# Patient Record
Sex: FEMALE | Race: WHITE | URBAN - METROPOLITAN AREA
[De-identification: names, ages, dates, MRNs, and addresses within clinical notes are randomized per-mention and may not be internally consistent; named-entity substitution may affect disease eponyms.]

---

## 2023-12-20 LAB
AMB EXT ANTIBODY SCREEN: NEGATIVE
AMB EXT HEMATOCRIT: 33
AMB EXT HEMOGLOBIN: 11.5
AMB EXT HIV AG AB COMBO: NEGATIVE
AMB EXT HPV: NEGATIVE
AMB EXT MCV: 93
AMB EXT PLATELETS: 293
AMB EXT RH FACTOR: POSITIVE
AMB EXT TRISOMY 13: NEGATIVE
AMB EXT URINE CULTURE ROUTINE: NEGATIVE

## 2024-01-02 ENCOUNTER — TELEPHONE (OUTPATIENT)
Dept: OBGYN CLINIC | Facility: CLINIC | Age: 39
End: 2024-01-02

## 2024-01-03 ENCOUNTER — OFFICE VISIT (OUTPATIENT)
Dept: OBGYN CLINIC | Facility: CLINIC | Age: 39
End: 2024-01-03

## 2024-01-03 VITALS
BODY MASS INDEX: 28.04 KG/M2 | SYSTOLIC BLOOD PRESSURE: 111 MMHG | HEIGHT: 68 IN | WEIGHT: 185 LBS | DIASTOLIC BLOOD PRESSURE: 75 MMHG | HEART RATE: 85 BPM

## 2024-01-03 DIAGNOSIS — O09.522 MULTIGRAVIDA OF ADVANCED MATERNAL AGE IN SECOND TRIMESTER: ICD-10-CM

## 2024-01-03 DIAGNOSIS — Z3A.14 14 WEEKS GESTATION OF PREGNANCY: Primary | ICD-10-CM

## 2024-01-03 PROBLEM — O09.529 ADVANCED MATERNAL AGE IN MULTIGRAVIDA: Status: ACTIVE | Noted: 2024-01-03

## 2024-01-03 PROBLEM — F41.9 ANXIETY: Status: ACTIVE | Noted: 2024-01-03

## 2024-01-03 PROBLEM — O09.529 ADVANCED MATERNAL AGE IN MULTIGRAVIDA (HCC): Status: ACTIVE | Noted: 2024-01-03

## 2024-01-03 PROCEDURE — 3078F DIAST BP <80 MM HG: CPT | Performed by: ADVANCED PRACTICE MIDWIFE

## 2024-01-03 PROCEDURE — 99203 OFFICE O/P NEW LOW 30 MIN: CPT | Performed by: ADVANCED PRACTICE MIDWIFE

## 2024-01-03 PROCEDURE — 3074F SYST BP LT 130 MM HG: CPT | Performed by: ADVANCED PRACTICE MIDWIFE

## 2024-01-03 PROCEDURE — 3008F BODY MASS INDEX DOCD: CPT | Performed by: ADVANCED PRACTICE MIDWIFE

## 2024-01-03 RX ORDER — MULTIVIT,IRON,MINERALS/LUTEIN
TABLET ORAL
COMMUNITY

## 2024-01-03 NOTE — PROGRESS NOTES
CHIEF COMPLAINT:  Chief Complaint   Patient presents with    Prenatal Care        HPI:  Merle is 38 year old, female, here today to establish care with this CNM office in pregnancy. Initiated care at outside clinic and had some initial lab work. Records were received.  Currently, she is feeling well. Denies 1st trimester danger signs. However, she had some spotting on  and went to [facility] ED. Ultrasound in [facility]where but live IUP with subchorionic hemorrhage noted.    Patient's last menstrual period was 2023 (approximate).          , term , boy, 8 lbs, denies complications, [facility]  , term , boy, 8 lbs 3 oz, denies complications, with [facility]   SAB  Current, as noted above. No current bleeding  LMP 23 but re-dated by ED ultrasound. 14w2d today with [facility] 24      HISTORY:  History reviewed. No pertinent past medical history.   History reviewed. No pertinent surgical history.   History reviewed. No pertinent family history.   Social History:   Social History     Socioeconomic History    Marital status: Single   Tobacco Use    Smoking status: Never    Smokeless tobacco: Never   Substance and Sexual Activity    Alcohol use: Never    Drug use: Not Currently          Medications (Active prior to today's visit):  Current Outpatient Medications   Medication Sig Dispense Refill    Prenatal Vit-Fe Fumarate-FA (MULTI PRENATAL) 27-0.8 MG Oral Tab Take by mouth.         Allergies:  Not on File    REVIEW OF SYSTEMS:  Review of Systems   Constitutional: Negative.    Genitourinary: Negative.         PHYSICAL EXAM:  Vitals:    24 1111   BP: 111/75   Pulse: 85     Physical Exam  Vitals reviewed.   Constitutional:       Appearance: Normal appearance.   HENT:      Head: Normocephalic.   Pulmonary:      Effort: Pulmonary effort is normal.   Neurological:      Mental Status: She is alert and oriented to person, place, and time.   Psychiatric:         Behavior: Behavior normal.          Thought Content: Thought content normal.         Judgment: Judgment normal.         ASSESSMENT/PLAN:   Merle was seen today for prenatal care.    Diagnoses and all orders for this visit:    14 weeks gestation of pregnancy    Multigravida of advanced maternal age in second trimester         Records reviewed and is appropriate for CNM care   Already taking prenatal vitamins  baby ASA not indicated. Risk factors bolded below    Pre-eclampsia Risk Assessment:   High Risk Factors (any 1 of below):   - H/O preeclampsia in prior pregnancy  - Autoimmune disease (lupus, antiphospholipid syndrome)  - Multifetal pregnancy  - cHTN  - Diabetes  Moderate Risk Factors (any 2 of below)    - Nulliparus  - Obesity  - H/O preeclampsia in 1st degree relative  - Socioeconomic characeristics (AA race, low socioeconomic status)  - AMA  - Personal H/O prior SGA or low birth weight, adverse pregnancy outcome, >10yr pregnancy interval    Next visit: Patient to schedule Nurse Education visit, next available, and NOB for 12wga    Counseling included:  -- CNM care and protocols  -- Discussed importance of folic acid  -- Warning signs reviewed. Advised to call office if occur  -- Schedule RN OB ED visit   -- 30 minutes face to face counseling, chart review, orders and coordination of care    Pt verbalized understanding. All questions answered. No barriers to learning identified

## 2024-01-03 NOTE — PATIENT INSTRUCTIONS
Healthy Eating Habits During Pregnancy    It’s important to develop healthy eating habits while you are pregnant, for you as well as for your baby. Here are some ways to stay healthy.  Aim for a healthy weight  A slow, steady rate of weight gain is often best. After the first trimester, you may gain about a pound a week. If you were overweight before pregnancy, you need to gain fewer pounds. Your healthcare provider can give you a healthy weight goal for your pregnancy.  Don’t diet  Now is not the time to diet. You may not get enough of the nutrients you and your baby need. Instead, learn how to be a healthy eater. Start by doing it for your baby. Soon, you may do it for yourself.  Vitamins and supplements  Talk with your healthcare provider about taking these and other prenatal vitamins and supplements.  Iron makes the extra blood you need now.  Calcium and vitamin D help build and keep strong bones.  Folic acid helps prevent certain birth defects.  Iodine helps the thyroid work right.  Some vitamins may not be safe to take. Your healthcare provider will tell you which ones to avoid.  Fluids  Drink at least 8 to 10 cups of fluid daily. Your baby needs fluids. Fluids also decrease constipation, flush out toxins and waste, limit swelling, and help prevent bladder infections. Water is best. Other good choices are:  Water or seltzer water with a slice of lemon or lime (These can also help ease an upset stomach.)  Clear soups that are low in salt  Low-fat or fat-free milk, soy or rice milk with calcium added  Popsicles or gelatin  Things to avoid  Some things might harm your growing baby. Don’t eat or drink:  Alcohol  Unpasteurized dairy foods and juices  Raw or undercooked meat, poultry, fish, or eggs  Unwashed fruits and vegetables  Prepared meats, like deli meats or hot dogs, unless heated until steaming hot  Fish that are high in mercury, like shark, swordfish, ryland mackerel, tilefish, and albacore tuna  Things to  limit  Ask your healthcare provider whether it’s safe to eat or drink:  Caffeine  Artificial sweeteners  Organ meats  Certain types of fish  Fish and shellfish that contain mercury in lower amounts, like shrimp, canned light tuna, salmon, pollock, and catfish  Shun last reviewed this educational content on 2018 [employer]. [address] [place] [address], [place] [address]. All rights reserved. This information is not intended as a substitute for professional medical care. Always follow your healthcare professional's instructions.        Nutrition During Pregnancy    Having a healthy baby depends mostly on you. What you eat matters to your baby and your health. During pregnancy, you will likely need about 300 more calories per day than before you became pregnant. Each day, try to eat the number of servings listed here for each food group. In addition, cut down on salt and caffeine. Limit the amount of sweets and high-fat foods you eat. Don’t smoke or drink alcohol.  Important: See your healthcare provider as often as requested. If you have any questions, be sure to ask them.  Fruits  2 cups  Examples of 1-cup servings:  1 medium apple  1 medium orange  1 medium banana  1 cup chopped fruit  1 cup 100% fruit juice (pasteurized)  1/2 cup dried fruit Vegetables  2-1/2 to 3 cups   Examples of 1 servin cups raw, leafy greens  1 cup raw or cooked cut-up vegetables  1 cup 100% vegetable juice (pasteurized) Grains & Cereals*  6 to 8 ounces  Examples of 1-ounce servings:  1 slice bread  1/2 cup cooked rice  1/2 cup cooked cereal  1/2 cup pasta  1 ounce cold cereal Fats & Oils  6 to 8 teaspoons   Dairy**  3 cups  Examples of 1-cup servings:  1 cup milk  1 cup yogurt  1-1/2 ounces natural cheese  2 ounces processed cheese Protein---  5 to 6-1/2 ounces  Examples of 1-ounce servings:  1 egg  1 ounce of lean meat, poultry, or fish  1/4 cup cooked beans  1 tablespoon peanut butter  1/2 ounce nuts  Fluids  8 or more 8-ounce glasses  Examples:  Water  Diluted juices: Apple, orange, cranberry  Mineral water  Clear soups, broth     *Note: Choose whole grains whenever possible.  ** Note: Try to choose low-fat options; avoid soft cheeses and unpasteurized milk.  --- Notes: Avoid raw or undercooked meats, eggs, and seafood. fish, and shellfish. Also, some types of fish, like shark, swordfish, and ryland mackerel should not be eaten during pregnancy. Avoid hot dogs, luncheon meats, and cold cuts unless heated to steaming just before being served. Ask your healthcare provider about safe choices.     Prenatal supplements  A prenatal supplement is a pill that you take daily during pregnancy. It helps make sure you’re getting the right amount of certain nutrients that are important to your baby. Ask your healthcare provider to help you choose the best one for you. Important nutrients during pregnancy include:  Folic acid. It's best to start taking this supplement 1 month before you start trying to get pregnant. Folic acid helps prevent certain problems in your baby. During pregnancy, you need to take 400 micrograms (mcg) of folic acid every day for the first 2 to 3 months after conception, and then 600 mcg is needed for growing fetus and placenta.  Iron, calcium, and vitamin D. You may also be advised to take these supplements during pregnancy. They help keep you and your baby healthy. Be sure to take them at different times because calcium makes it hard for the body to absorb iron. Taking iron with orange juice helps to increase its absorption.   [employer] last reviewed this educational content on 12/1/2017 © 2000-2020 The [employer]. [address] [place] [address], [place] [address]. All rights reserved. This information is not intended as a substitute for professional medical care. Always follow your healthcare professional's instructions.        Pregnancy: Planning Your Exercise Routine    While you’re pregnant, an  exercise routine helps both your mind and your body feel good. It tones your muscles and makes them stronger. It also gives you and your baby more oxygen.  The right exercise for you  Overall conditioning is best for you and your baby. Try walking, swimming, or riding a stationary bike. Always warm up, cool down, and drink enough fluids. Keep a snack close by in case your blood sugar gets low. Discuss exercise choices with your healthcare provider. Talk about the following:  If you already exercise, find out how to adapt your routine while you’re pregnant. Keep the intensity of the exercise moderate.  Ask if there are any local prenatal exercise classes, like yoga or water aerobics. Find out which prenatal exercise videos are good choices.  If you were not exercising before your pregnancy, find out the best way to start. Now is not the time to begin a new workout on your own. Start slowly. Listen to your body.  Ask which forms of exercise you should avoid. These may include risky activities like hot yoga, horseback riding, scuba diving, skiing, skating, and contact sports.  Pelvic tilts  These help strengthen your stomach muscles and low back. You can do pelvic tilts instead of sit-ups.  Do this exercise on your hands and knees.  Relax the back of your neck. Pull your stomach in until your low back flattens.  Hold for 30 seconds. Release. Repeat 10 times. Do this twice a day.  Kegel exercises  Kegel exercises strengthen the pelvic muscles. Doing Kegels daily helps prepare these muscles for delivery. Kegels also help ease your recovery. You exercise these muscles by tightening, holding, then relaxing them. To do 1 type of Kegel exercise, contract as if you were stopping your urine stream (but do it when you’re not urinating). Hold for 10 seconds, then repeat 10 times, a few times a day.  Tips to add activity  Here are some tips to follow:  Park the car farther from a store and walk.  If you can, do errands on foot  instead of driving.  Walk across the office to talk to someone in person instead of calling.  While waiting for appointments, go up and down stairs or around the block.   Tips to stay active  Here are some tips to follow:  Maintain your routine. But exercise less intensely if you feel tired.  Base your workout on how you feel, not your heart rate. Heart rates aren’t a good way to measure effort during pregnancy.  Avoid exercising on your back after week 16.   What are the warning signs that I should stop exercising?  Stop exercising and call your healthcare provider if you have any of these symptoms:  Vaginal bleeding   Dizziness or feeling faint   Increased shortness of breath   Chest pain   Headache   Muscle weakness   Calf (back of the leg) pain or swelling    Uterine contractions or pre-term labor   Decreased fetal movement   Fluid leaking (or gushing) from your vagina  [employer] last reviewed this educational content on 1/1/2018  © 7298-5860 [employer]. [address] [place] [address]. All rights reserved. This information is not intended as a substitute for professional medical care. Always follow your healthcare professional's instructions.        Exercise During Pregnancy  Regular exercise can help you adapt to the changes your body is going through during pregnancy. Exercising may help you relax, and it gets you ready for labor and delivery. Talk to your healthcare provider about the kinds of activities you can do. Then go ahead and enjoy them.    Get started  Even if you didn’t exercise before pregnancy, it is not too late to start. Choose an activity that you like and that fits your lifestyle. Begin slowly and build up a little at a time. Be sure to check with your healthcare provider before starting any exercise program. The following tips may help you get started:  Choose a time and place to exercise each day.  Wear loose-fitting clothes and comfortable athletic shoes.  Stretch  before and after you exercise. (Be sure to stretch slowly and to hold stretches for 30 to 40 seconds.)  Be active  Unless your healthcare provider says otherwise, try to exercise for 30 minutes or more most days of the week:  Overall conditioning, like swimming, bicycling, or walking, is especially beneficial.  Aerobics and exercises that increase your pulse rate help condition your body and strengthen your heart. Ask about special prenatal aerobics classes.  Exercise safely  These tips will help you have a safe, healthy workout:  Stay cool. Stop exercising if you feel overheated.  Slow down if you’re out of breath. If you can’t talk during exercise, lower the intensity of the workout.  Monitor the intensity of your workout. Only do moderate-intensity (not strenuous) exercise.  Stay off your back. Lying on your back can decrease blood flow to your baby.  Drink water before, during and after your workout.  Eat 300 extra calories a day. A light snack before and after you exercise will help keep your energy up.  Avoid activities requiring balancing skills later in pregnancy.  Do Kegel exercises  Kegel exercises strengthen the pelvic floor muscles used in childbirth. These muscles are the same ones used to stop the flow of urine. Do Kegel exercises daily:  Squeeze your pelvic floor muscles for a count of 3.  Relax, then squeeze again.  Repeat 10 to 15 times in a row at least 3 times a day.  You can do Kegel exercises anytime and anywhere.  Keep walking  No matter what other exercise you do, try to walk whenever you can:  If you’re working all day, take a lunchtime walk in the park with a friend.  When you shop, park away from the store entrance and walk the extra distance.  Take the stairs instead of the elevator.     When to stop exercising and call your healthcare provider  Call your healthcare provider right away if you have:  Shortness of breath before starting exercise  Vaginal bleeding  Dizziness or feeling  faint  Chest pain  Headache  Decreased fetal movement   contractions   Muscle weakness  Calf pain or swelling  Fluid leaking from the vagina   [employer] last reviewed this educational content on 2017 [employer]. [address] [place] [address], [place] [address]. All rights reserved. This information is not intended as a substitute for professional medical care. Always follow your healthcare professional's instructions.        Pregnancy: Your Weight     Your weight will be checked regularly by your healthcare provider.   Being a healthy weight is important for both you and your baby. The weight you gain now is not just extra fat. It is also the weight of your baby. And it is the increased blood and fluids to support the baby. A slow, steady rate of gain is best. How much you should gain depends on your weight before getting pregnant. Check with your healthcare provider to find out what is right for you.  Talk to your healthcare provider if you have any questions.   If you gain too much  Gaining too much weight might cause you to feel tired or you could have a harder pregnancy or birth. If you and your healthcare provider decide you’re gaining too much:  Eat fewer fats and sugars. Instead, eat fruit, vegetables, and whole-grain foods.  Drink plenty of water between meals.  Get at least 20 minutes of light exercise, like walking, each day.  Don’t diet. You might not get enough of the nutrients you or your baby needs.  Keep a diet diary to help you gauge what and how much you are eating.  If you’re not gaining enough  If you don’t gain enough, your baby could be too small or have health problems. Women tend to gain most of their weight in the second and third trimesters. For now:  Eat many types of foods. Make sure you get enough calcium, protein, and carbohydrates.  Don’t skip meals.  Eat healthy snacks.  Pick nutrient-dense, high-calorie healthy food like trail mix or protein  flores.  See a dietitian for help.  Talk to your healthcare provider if you have had an eating disorder or problems with certain foods.  The following are ways to get more calories:  Eat breakfast every day. Peanut butter or a slice of cheese on toast can give you an extra protein boost.  Snack between meals. Yogurt and dried fruits can provide protein, calcium, and minerals.  Try to eat more foods that are high in good fats, like nuts, fatty fish, avocados, and olive oil.  Drink juices made from real fruit that are high in vitamin C or beta carotene, like grapefruit juice, orange juice, papaya nectar, apricot nectar, and carrot juice.  Avoid junk food, like foods high in sugar.  [employer] last reviewed this educational content on 1/1/2018 © 2000-2020 The [employer]. [address] [place] [address]. All rights reserved. This information is not intended as a substitute for professional medical care. Always follow your healthcare professional's instructions.        Dental Care for Pregnant Women  Pregnancy is a time when your oral health needs more attention. Hormone changes during pregnancy can cause certain problems with teeth and gums, and make treatment more complicated.  How pregnancy affects oral health  During pregnancy, hormone changes can cause swollen, bleeding, and irritated gums (gingivitis). Your gums may be very sore, and brushing and flossing may cause discomfort. If left untreated, gingivitis can lead to a more serious gum disease called periodontitis. Severe periodontitis can lead to tooth loss.  Some pregnant women also have small bright-red growths on their gums that bleed easily. These are often called “pregnancy tumors.” They are not cancer. They usually go away right after birth. Talk with your dentist or healthcare provider if you have concerns.  Keeping a healthy mouth  Brush twice daily with fluoride toothpaste. Floss at least once a day.  If you have morning sickness,  rinse your mouth with a teaspoon of baking soda mixed with water after vomiting. Do not brush your teeth right after vomiting. This can remove tooth enamel.  See your dentist for cleanings and checkups more often if needed. This is especially true in your second and third trimesters.  Ask your dentist or healthcare provider if you should use a special mouth rinse to help prevent gingivitis.  Tell your dentist or healthcare provider about any changes in your mouth, such as soreness or bleeding.  Safety concerns  Make sure to tell your dentist that you’re pregnant. He or she can help you stay safe. If you need to have dental X-rays during pregnancy, he or she will make sure you are fully protected. You will wear a lead apron over your belly during the X-ray process. The apron helps block radiation from the X-rays.  If you need to take medicines like antibiotics or pain relievers for dental problems, talk with your healthcare providers first. Your providers will discuss the risks and benefits of taking these during pregnancy.  If you have a high-risk pregnancy, your dentist and your healthcare provider may advise that certain dental treatments wait until after you give birth.  [email or web address] last reviewed this educational content on 12/1/2017 © 2000-2020 [employer]. [address] [place] [address], [place] [address]. All rights reserved. This information is not intended as a substitute for professional medical care. Always follow your healthcare professional's instructions.           Comfort Tips During Pregnancy    Talk with your healthcare provider before using pain-relieving medicine at any time during your pregnancy.  First trimester tips  Nausea  Get up slowly. Eat a few unsalted crackers before you get out of bed.  Avoid smells that bother you.  Eat small bland low fat, light high-protein meals at frequent intervals.  Sip on water, weak tea, or clear soft drinks, like ginger ale. Eat ice chips.  Fatigue  Take  catnaps when you can.  Get regular exercise.  Accept help from others.  Practice good sleep habits, like going to bed and getting up at the same time each day. Use your bed only for sleep and sex.  Mood swings  Talk about your feelings with others, including other mothers.  Limit sugar, chocolate, and caffeine.  Eat a healthy diet. Don’t skip meals.  Get regular exercise.  Headaches  Get fresh air and exercise.  Relax and get enough rest.  Check with your healthcare provider before taking any pain medicines.  Second trimester tips  Here are some suggestions to help you cope:  To limit ankle swelling, sit with your feet raised or wear support hose.  If you have pain in your groin and stomach (round ligament pain), avoid sudden twisting movements.  For leg cramps, flexing your foot often brings immediate relief. You also may try massaging your calf in long, downward strokes, or stretching your legs before going to bed. Get enough exercise and wear shoes with flexible soles.  Third trimester tips  Reducing heartburn  Eat small, light meals throughout the day rather than 3 large ones.  Sleep with your upper body raised 6 inches. Don’t lie down until 2 hours after you eat.  Don't eat greasy, fried, or spicy foods.  Avoid citrus fruits and juices.  Treating constipation  Eat foods high in fiber (whole-grain foods, fresh fruit and vegetables).  Drink plenty of water.  Get regular exercise.  Discuss other medicines (like docusate and psyllium) with your healthcare provider.  Taking care of your breasts  Avoid using harsh soaps or alcohol, which can cause excessive dryness.  Wear nursing bras. They provide more support than regular bras and can be used after pregnancy if you breastfeed.  Getting a good night’s sleep  Take a warm shower before bed.  Sleep on a firm mattress.  Lie on your side with 1 leg crossed over the other.  Use pillows to support arms, legs, and belly.  Shun last reviewed this educational content on  10/1/2017  © 2566-8444 [employer]. [address] [place] [address], [place] [address]. All rights reserved. This information is not intended as a substitute for professional medical care. Always follow your healthcare professional's instructions.        Adapting to Pregnancy: Second Trimester    Keep up the healthy habits you started in your first trimester. You might be a little more tired than normal. So plan your day wisely. Look at the tips below and choose the ones that suit your lifestyle.  If you have any questions, check with your healthcare provider.  If you work  If you can, adjust your work with your employer to fit your needs. Try these tips:  If you stand for long periods, find ways to do some tasks while sitting. Also, try to stand with 1 foot resting on a low stool or ledge. Shift your weight from foot to foot often. Wear low-heeled shoes.  If you sit, keep your knees level with your hips. Rest your feet on a firm surface. Sit tall with support for your low back.  If you work long hours, ask about adjusting your schedule. Try taking shorter breaks more often.  When you travel  The second trimester may be the best time for any travel. Talk to your healthcare provider about any special plans you may need to make. Always:  Wear a seat belt. Fasten the lap part under your belly. Wear the shoulder part also.  Take breaks often during long trips by car or plane. Move around to stretch your legs.  Drink plenty of fluids on flights. The air in plane cabins is very dry.  Avoid hot climates or high altitudes if you are not used to them.  Avoid places where the food and water might make you sick.  Make sure you are up-to-date on all immunizations, including the flu vaccine. This is especially important when traveling overseas.  Taking time to relax  Find time to rest and relax at work or at home:  Take short time-outs daily. Do relaxation exercises.  Breathe deeply during stressful times.  Try not to take  on too much. Plan tasks for times when you have the most energy.  Take naps when you can. Or just sit and relax.  After week 16, avoid lying on your back for more than a few minutes. Instead, lie on your side. Switch sides often.  Continuing as lovers  Unless your healthcare provider tells you otherwise, there is no reason to stop having sex now. Blood supply increases to the pelvic area in the second trimester. Because of this, sex might be more enjoyable. Try different positions and see what’s best. Also, talk to your partner about any changes in desire. Spotting may happen after sex. Be sure to let your healthcare provider know if there is heavy bleeding.  Keeping your environment safe  You can still clean house and use scented products. Just take some simple precautions:  Wear gloves when using cleaning fluids.  Open windows to let in fresh air. Use a fan if you paint.  Avoid secondhand smoke.  Don’t breathe fumes from nail polish, hair spray, cleansers, or other chemicals.  Check last reviewed this educational content on 1/1/2018 © 2000-2020 The [employer]. [address] [place] [address]. All rights reserved. This information is not intended as a substitute for professional medical care. Always follow your healthcare professional's instructions.        Pregnancy: Your Second Trimester Changes  Each day, you and your baby are changing and growing together. Here’s a quick look at what’s happening to both of you.  How you are changing  Even when you don’t notice it, your body is adapting to meet the needs of your growing baby. The changes in your body might also affect your moods.  Your body  Your uterus expands as baby grows. As the weeks go by, you will feel more pressure on your bladder, stomach, and other organs. You may notice some skin color changes on your forehead, nose, or cheeks. Freckles may darken, and moles may grow. You may notice a darker line on your abdomen between your  belly button and pubic bone in the midline.  Your moods  The second trimester is often easier than the first. Still, be prepared for mood swings. These are due to the increase in hormones (chemicals that affect the way organs work) produced by your body. These mood swings are a normal part of pregnancy.  How your baby is growing          Month 4  Baby’s heartbeat may be heard with a Doppler (hand-held ultrasound device) by 9 to 10 weeks. Eyebrows, eyelashes, and fingernails begin to form.  Month 5  You may feel your baby move. After a growth spurt, your baby nears 10 inches. Month 6  Baby’s fingerprints have formed. Your baby weighs about 1 to 2 pounds and is about 12 inches long.   [employer] last reviewed this educational content on 1/1/2018  © 9163-5016 [employer]. [address] [place] [address], [place] [address]. All rights reserved. This information is not intended as a substitute for professional medical care. Always follow your healthcare professional's instructions.

## 2024-01-08 ENCOUNTER — NURSE ONLY (OUTPATIENT)
Dept: OBGYN CLINIC | Facility: CLINIC | Age: 39
End: 2024-01-08

## 2024-01-08 DIAGNOSIS — O09.522 ADVANCED MATERNAL AGE IN MULTIGRAVIDA, SECOND TRIMESTER: ICD-10-CM

## 2024-01-08 DIAGNOSIS — Z34.82 ENCOUNTER FOR SUPERVISION OF OTHER NORMAL PREGNANCY IN SECOND TRIMESTER: Primary | ICD-10-CM

## 2024-01-08 NOTE — PROGRESS NOTES
Phone Nurse Education Completed  PT verbalized understanding     Orders placed in results console  Needs- Hep C., Varicella, TSH, A1C     Abnormal Pap- No  Last Pap- 23  Genetic Testing-  Completed  Circumcision- Declines  Feeding-Breast  Emergency Transfusion- Agreeable  EPDS-2  Type of birth- No Epidural   How [place] [facility]-  Used with previous pregnancy    Consults Placed- Jose Francisco Fried Navigator      Pt. Has answered NO 5P questions and has NO  risk factors.      Pt. Given What pregnant women need to know handout.        Patient verbalized understanding that [employer] messaging is to be used for non-urgent medical questions, and to call the office with any vaginal bleeding, leaking of fluid, cramping, decreased fetal movement, or any other urgent medical question.

## 2024-01-15 ENCOUNTER — INITIAL PRENATAL (OUTPATIENT)
Dept: OBGYN CLINIC | Facility: CLINIC | Age: 39
End: 2024-01-15

## 2024-01-15 VITALS
BODY MASS INDEX: 28 KG/M2 | DIASTOLIC BLOOD PRESSURE: 73 MMHG | HEART RATE: 76 BPM | WEIGHT: 186 LBS | SYSTOLIC BLOOD PRESSURE: 110 MMHG

## 2024-01-15 DIAGNOSIS — O09.522 ADVANCED MATERNAL AGE IN MULTIGRAVIDA, SECOND TRIMESTER: Primary | ICD-10-CM

## 2024-01-15 PROCEDURE — 99212 OFFICE O/P EST SF 10 MIN: CPT | Performed by: ADVANCED PRACTICE MIDWIFE

## 2024-01-15 PROCEDURE — 3074F SYST BP LT 130 MM HG: CPT | Performed by: ADVANCED PRACTICE MIDWIFE

## 2024-01-15 PROCEDURE — 3078F DIAST BP <80 MM HG: CPT | Performed by: ADVANCED PRACTICE MIDWIFE

## 2024-01-19 NOTE — PROGRESS NOTES
Feeling well. Order placed for level II ultrasound and MFM consult. Reviewed danger signs RTC 4 weeks. Reviewed labs, normal genetic testing.

## 2024-01-30 NOTE — PROGRESS NOTES
Outpatient Maternal-Fetal Medicine Consultation    Dear Ms. Potts    Thank you for requesting ultrasound evaluation and maternal fetal medicine consultation on your patient Merle Bell.  As you are aware she is a 38 year old female  with a singletonpregnancy and an Estimated Date of Delivery: 24.  A maternal-fetal medicine consultation was requested secondary to advanced maternal age.  Her prenatal records and labs were reviewed.    HISTORY  # 1 - Date: 11, Sex: Male, Weight: 8 lb (3.629 kg), GA: 39w0d, Delivery: Vaginal, Spontaneous, Apgar1: None, Apgar5: None, Living: None, Birth Comments: None    # 2 - Date: 12, Sex: Male, Weight: 8 lb 3 oz (3.714 kg), GA: 38w0d, Delivery: Vaginal, Spontaneous, Apgar1: None, Apgar5: None, Living: None, Birth Comments: None    # 3 - Date: , Sex: None, Weight: None, GA: 5w0d, Delivery: None, Apgar1: None, Apgar5: None, Living: None, Birth Comments: None    # 4 - Date: None, Sex: None, Weight: None, GA: None, Delivery: None, Apgar1: None, Apgar5: None, Living: None, Birth Comments: None      Past Medical History  The patient  has no past medical history on file.    Past Surgical History  The patient  has no past surgical history on file.    Family History  The patient She indicated that the status of her father is unknown.      Medications:   Current Outpatient Medications:     Prenatal Vit-Fe Fumarate-FA (MULTI PRENATAL) 27-0.8 MG Oral Tab, Take by mouth., Disp: , Rfl:   Allergies: Not on File    PHYSICAL EXAMINATION:  /62   Pulse 88   Wt 192 lb (87.1 kg)   LMP  (Approximate)   BMI 29.19 kg/m²   General: alert and oriented,no acute distress  Abdomen: gravid, soft, non-tender  Extremities: non-tender, no edema    OBSTETRIC ULTRASOUND  The patient had a level II ultrasound today which revealed size consistent with dates and a normal detailed anatomic survey.      Ultrasound Findings:  Single IUP in breech presentation.    Placenta is  posterior, high.   A 3 vessel cord is noted.  Cardiac activity is present at 146 bpm   g ( 0 lb 13 oz);   MVP is 6.3 cm .     The fetal measurements are consistent with the established EDC. No ultrasound evidence of structural abnormalities are seen today. The nasal bone is present. No ultrasound evidence of markers for aneuploidy are seen. She understands that ultrasound exam cannot exclude genetic abnormalities and that genetic testing is recommended. The limitations of ultrasound were discussed.     Uterus and adnexa appeared WNL today on US    See PACS/Imaging Tab For Complete Ultrasound Report  I interpreted the results and reviewed them with the patient.    DISCUSSION  During her visit we discussed and reviewed the following issues:  ADVANCED MATERNAL AGE    Background  I reviewed with the patient that pregnancies in women of advanced maternal age (35 or older at delivery) are associated with elevated risks.  Specifically, there is a higher rate of:    Fetal malformations  Preeclampsia  Gestational diabetes  Intrauterine fetal death    As a result, enhanced pregnancy surveillance is advised for these patients including a comprehensive ultrasound to assess for fetal malformations  (at 20 weeks) and a third trimester ultrasound assessment for fetal growth (at 32 weeks).  In addition, weekly NST's (initiating at 36 weeks gestation for women 35-39 years and at 32 weeks gestation for women 40 years and older) are also advised.  Routine obstetric care is more than adequate to assess for gestational diabetes and preeclampsia; hence, no further significant alterations in obstetric care are advised.    Fetal Aneuploidy    We also discussed the increased risk of chromosomal abnormalities associated with advanced maternal age.  I reviewed that an ultrasound examination cannot reliably exclude potential genetic abnormalities. Given that the patient will be 39 years old at the time of delivery I reviewed that her  risk (at delivery) of having a  with any chromosome abnormality is 1:53 and with trisomy 21 is 1: 82.    Invasive Testing    I offered invasive genetic testing (amniocentesis, chorionic villus sampling) after reviewing the diagnostic accuracy of these tests as well as the procedure associated loss rate (1:500 for genetic amniocentesis).    She ultimately does not desire invasive genetic testing.     Non-invasive Pregnancy Testing (NIPT)    I reviewed current non-invasive screening options.  Currently non-invasive pregnancy testing (NIPT) offers the highest detection rate (with the lowest false positive rate) for the detection of fetal aneuploidy amongst high-risk patients.  The limitations of detailed mid-trimester sonography was reviewed with the patient.  First trimester screening and second trimester multiple-marker serum serum screening as alternative aneuploidy screening options were also reviewed.  However, both of these tests are associated with lower detection and higher false positive rates.    The patient has already obtained a low-risk  NPIT result and was appropriately reassured.  -Per patient report, obtained with prior provider    IMPRESSION:  IUP at 20w1d  AMA: low-risk cell free fetal DNA, declined invasive testing    RECOMMENDATIONS:  Continue care with Ms. Potts  Follow-up growth & BPP ultrasound at 32 weeks.  Weekly NST's at 32 weeks.  Twice weekly testing at 38 weeks - weekly NST and weekly BPP.  Delivery at 39-40 weeks.    Thank you for allowing me to participate in the care of your patient.  Please do not hesitate to contact me if additional questions or concerns arise.      Reinier Abebe M.D.    40 minutes spent in review of records, patient consultation, documentation and coordination of care.  The relevant clinical matter(s) are summarized above.     Note to patient and family  The 21st Century Cures Act makes medical notes available to patients in the interest of transparency.   However, please be advised that this is a medical document.  It is intended as boeo-ar-eidx communication.  It is written and medical language may contain abbreviations or verbiage that are technical and unfamiliar.  It may appear blunt or direct.  Medical documents are intended to carry relevant information, facts as evident, and the clinical opinion of the practitioner.

## 2024-02-13 ENCOUNTER — LAB ENCOUNTER (OUTPATIENT)
Dept: LAB | Facility: HOSPITAL | Age: 39
End: 2024-02-13
Attending: ADVANCED PRACTICE MIDWIFE
Payer: MEDICAID

## 2024-02-13 ENCOUNTER — HOSPITAL ENCOUNTER (OUTPATIENT)
Dept: PERINATAL CARE | Facility: HOSPITAL | Age: 39
Discharge: HOME | End: 2024-02-13
Attending: ADVANCED PRACTICE MIDWIFE
Payer: MEDICAID

## 2024-02-13 ENCOUNTER — ROUTINE PRENATAL (OUTPATIENT)
Dept: OBGYN CLINIC | Facility: CLINIC | Age: 39
End: 2024-02-13

## 2024-02-13 ENCOUNTER — HOSPITAL ENCOUNTER (OUTPATIENT)
Dept: PERINATAL CARE | Facility: HOSPITAL | Age: 39
Discharge: HOME | End: 2024-02-13
Attending: OBSTETRICS & GYNECOLOGY
Payer: MEDICAID

## 2024-02-13 VITALS
BODY MASS INDEX: 29 KG/M2 | SYSTOLIC BLOOD PRESSURE: 116 MMHG | DIASTOLIC BLOOD PRESSURE: 76 MMHG | WEIGHT: 191 LBS | HEART RATE: 73 BPM

## 2024-02-13 VITALS
WEIGHT: 192 LBS | HEART RATE: 88 BPM | BODY MASS INDEX: 29 KG/M2 | DIASTOLIC BLOOD PRESSURE: 62 MMHG | SYSTOLIC BLOOD PRESSURE: 122 MMHG

## 2024-02-13 DIAGNOSIS — O09.522 MULTIGRAVIDA OF ADVANCED MATERNAL AGE IN SECOND TRIMESTER: Primary | ICD-10-CM

## 2024-02-13 DIAGNOSIS — O09.522 ADVANCED MATERNAL AGE IN MULTIGRAVIDA, SECOND TRIMESTER: Primary | ICD-10-CM

## 2024-02-13 DIAGNOSIS — O09.522 ADVANCED MATERNAL AGE IN MULTIGRAVIDA, SECOND TRIMESTER: ICD-10-CM

## 2024-02-13 DIAGNOSIS — Z36.89 ENCOUNTER FOR FETAL ANATOMIC SURVEY (HCC): ICD-10-CM

## 2024-02-13 DIAGNOSIS — O09.522 MULTIGRAVIDA OF ADVANCED MATERNAL AGE IN SECOND TRIMESTER: ICD-10-CM

## 2024-02-13 DIAGNOSIS — Z34.82 ENCOUNTER FOR SUPERVISION OF OTHER NORMAL PREGNANCY IN SECOND TRIMESTER: ICD-10-CM

## 2024-02-13 LAB
EST. AVERAGE GLUCOSE BLD GHB EST-MCNC: 91 MG/DL (ref 68–126)
HBA1C MFR BLD: 4.8 % (ref ?–5.7)
HCV AB SERPL QL IA: NONREACTIVE
TSI SER-ACNC: 1.03 MIU/ML (ref 0.55–4.78)

## 2024-02-13 PROCEDURE — 86787 VARICELLA-ZOSTER ANTIBODY: CPT

## 2024-02-13 PROCEDURE — 83036 HEMOGLOBIN GLYCOSYLATED A1C: CPT

## 2024-02-13 PROCEDURE — 99213 OFFICE O/P EST LOW 20 MIN: CPT | Performed by: ADVANCED PRACTICE MIDWIFE

## 2024-02-13 PROCEDURE — 36415 COLL VENOUS BLD VENIPUNCTURE: CPT

## 2024-02-13 PROCEDURE — 86803 HEPATITIS C AB TEST: CPT

## 2024-02-13 PROCEDURE — 84443 ASSAY THYROID STIM HORMONE: CPT

## 2024-02-13 PROCEDURE — 76811 OB US DETAILED SNGL FETUS: CPT | Performed by: OBSTETRICS & GYNECOLOGY

## 2024-02-13 PROCEDURE — 82105 ALPHA-FETOPROTEIN SERUM: CPT

## 2024-02-13 NOTE — PROGRESS NOTES
Here for EVY visit.      No LMP recorded (approximate). Patient is pregnant. 2024, by Ultrasound 20w1d     Baby active. Denies contractions, LOF or bleeding    Labs: Desires AFP- just had labs drawn, will add on.   Reviewed Ultrasound & MFM note: Just came from us. Normal Level 2. 20 , posterior high placenta, 3 VC, normal anatomy. Has growth ultrasound scheduled      warning signs reviewed.     Hx Uncomplicated  x 2. Has 2 older boys at home. This is new FOB, they have been together about a year.

## 2024-02-14 LAB — VZV IGG SER IA-ACNC: 855.7 (ref 165–?)

## 2024-02-15 LAB
AFP MOM: 0.59
AFP VALUE: 29.1 NG/ML
GA ON COLL DATE: 20 WEEKS
INSULIN DEP AFP: NO
MAT AGE AT EDD: 41.3 YR
MULTIPLE GEST AFP: NO
OSBR RISK 1 IN AFP: NORMAL
WEIGHT AFP: 191 LBS

## 2024-02-21 ENCOUNTER — TELEPHONE (OUTPATIENT)
Dept: OBGYN CLINIC | Facility: CLINIC | Age: 39
End: 2024-02-21

## 2024-02-21 NOTE — TELEPHONE ENCOUNTER
Pt is 22 weeks feeling depressed and  tired ,  Pt boyfriend called ,   You can call her at number provided ,

## 2024-02-21 NOTE — TELEPHONE ENCOUNTER
Name and  verified  Patient aware bf has called.       Patient is tearful on the phone. Patient states she has been feeling down and depressed nearly every day. She reports poor sleep. Denies any thought of harming herself or others.     She has a counselor that she works with, and was just seen yesterday.     Patient offered a midwife appointment today, but unable to accept. Scheduled for 9:30 video visit with SACHIN.   Patient verbalized understanding to call the office with any worsening depression, and stressed importance of reaching out of she has any thought of harming self or others.     Secure chat sent to on call provider to make aware.

## 2024-02-23 PROBLEM — F32.A DEPRESSION DURING PREGNANCY IN SECOND TRIMESTER (HCC): Status: ACTIVE | Noted: 2024-02-23

## 2024-02-23 PROBLEM — O99.342 DEPRESSION DURING PREGNANCY IN SECOND TRIMESTER (HCC): Status: ACTIVE | Noted: 2024-02-23

## 2024-02-23 PROBLEM — Z87.891 HISTORY OF SMOKING: Status: ACTIVE | Noted: 2024-02-23

## 2024-03-12 ENCOUNTER — ROUTINE PRENATAL (OUTPATIENT)
Dept: OBGYN CLINIC | Facility: CLINIC | Age: 39
End: 2024-03-12

## 2024-03-12 VITALS
HEART RATE: 76 BPM | SYSTOLIC BLOOD PRESSURE: 102 MMHG | WEIGHT: 194 LBS | BODY MASS INDEX: 30 KG/M2 | DIASTOLIC BLOOD PRESSURE: 68 MMHG

## 2024-03-12 DIAGNOSIS — Z34.83 ENCOUNTER FOR SUPERVISION OF OTHER NORMAL PREGNANCY IN THIRD TRIMESTER (HCC): Primary | ICD-10-CM

## 2024-03-12 DIAGNOSIS — F32.A: ICD-10-CM

## 2024-03-12 DIAGNOSIS — O99.342: ICD-10-CM

## 2024-03-12 PROCEDURE — 99213 OFFICE O/P EST LOW 20 MIN: CPT | Performed by: ADVANCED PRACTICE MIDWIFE

## 2024-03-12 NOTE — PROGRESS NOTES
I personally performed the patient's exam and medical decision making on this date of service.  I was physically present in the room for the performance of the E/M service.  I have reviewed the CNM student's documentation and findings including history, Exam, and Medical Decision Making, edited the document for accuracy and verify that it represents the clinical findings and services performed.     Diagnoses and all orders for this visit:    Encounter for supervision of other normal pregnancy in third trimester ([facility])  -     CBC, Platelet; No Differential; Future  -     Glucose Tolerance, 50 gm (1 hr), Gestational (ADA); Future  -     HIV Ag/Ab Combo; Future  -     T Pallidum Screening [place]; Future    Depression during pregnancy in second trimester ([facility])      Stable EPDS 13 (last visit 12)

## 2024-03-12 NOTE — PROGRESS NOTES
Patient presents for routine OB visit. Overall, feeling well. Patient reports active fetus. Denies loss of fluid, vaginal bleeding, and contractions.    Mood: Patient sees therapist once a week and feels supported. Not on any medication. Patient reports no longer smoking. Reviewed warning signs and to reach out if patients symptoms need further managment. EPDS score today is 13 (stable last visit 12)    Reviewed anatomy ultrasound with patient.  Level II screening ultrasound results:  RECOMMENDATIONS:  Follow-up growth & BPP ultrasound at 32 weeks.  Weekly NST's at 32 weeks.  Twice weekly testing at 38 weeks - weekly NST and weekly BPP.  Delivery at 39-40 weeks.    3rd trimester labs and 1 hour GTT ordered. Instructions given to patient  32 week ultrasound and BPP scheduled      Reviewed  labor precautions  Reviewed warning signs    RTC 4 weeks    Assessment and Plan completed by CAMI Barbour under the direct supervision of Zuly Clark CNM    I personally performed the patient's exam and medical decision making on this date of service.  I was physically present in the room for the performance of the E/M service.  I have reviewed the ALF student's documentation and findings including history, Exam, and Medical Decision Making, edited the document for accuracy and verify that it represents the clinical findings and services performed.

## 2024-03-20 ENCOUNTER — PATIENT MESSAGE (OUTPATIENT)
Dept: OBGYN CLINIC | Facility: CLINIC | Age: 39
End: 2024-03-20

## 2024-03-21 ENCOUNTER — TELEMEDICINE (OUTPATIENT)
Dept: OBGYN CLINIC | Facility: CLINIC | Age: 39
End: 2024-03-21
Payer: MEDICAID

## 2024-03-21 DIAGNOSIS — O09.529 SUPERVISION OF HIGH-RISK PREGNANCY OF ELDERLY MULTIGRAVIDA (HCC): Primary | ICD-10-CM

## 2024-03-21 DIAGNOSIS — O99.332: ICD-10-CM

## 2024-03-21 PROCEDURE — 99213 OFFICE O/P EST LOW 20 MIN: CPT | Performed by: ADVANCED PRACTICE MIDWIFE

## 2024-03-22 ENCOUNTER — TELEPHONE (OUTPATIENT)
Dept: OBGYN CLINIC | Facility: CLINIC | Age: 39
End: 2024-03-22

## 2024-03-22 NOTE — PROGRESS NOTES
This visit is conducted using Telemedicine with live, interactive video and audio.    Patient has been referred to the [facility] website at [email or web address] to review the yearly Consent to Treat document.    Patient understands and accepts financial responsibility for any deductible, co-insurance and/or co-pays associated with this service.    Duration of face-to-face time in visit was 15 minutes. I have spent 20 minutes total time on the day of the encounter, including: preparing to see the patient, ordering/reviewing labs, performing a medically appropriate exam, and providing care coordination. face to face counseling, chart review, orders and coordination of care     Merle, , is at 25w4d. She scheduled this video visit today to discuss her smoking status and smoking cessation options. Currently, she is feeling well. Denies 2nd trimester danger signs. She has been trying to quit smoking since the beginning of pregnancy. She was smoking a pack a day prior to pregnancy. She got down to 0 cigarettes a day for a few weeks. Then she had a relapse of 1 day in February but was able to get back on the wagon after that for the past few weeks. Then yesterday she relapsed again and smoked 10 cigarettes and today a few. She is very motivated to quit during pregnancy. In the past Chantix helped her, wondering if she can use that during pregnancy. She has also been struggling with depression during this pregnancy but has been trying to work through that with her therapist without the use of medications. Tried wellbutrin in the past for depression but had a very bad reaction on it and felt it made her crazy.      Assessment/Plan:   Discussed that if her depression is making it harder for her to stop smoking it might be time to reconsider medication for depression as sometimes antidepressants can help with the cravings to smoke.   Discussed that first line choice for smoking cessation in pregnancy is nicotine  replacement. Patient has used this in the past and found it helpful but didn't know that she could use it in pregnancy. Discussed that for someone smoking less than 5 cigarettes a day no NRT is recommended but if smoking more than 5 cigarettes per day can use the lowest dose possible to help curb cravings. Discussed importance of not smoking cigarettes while using NRT as that is very bad for baby. Pt verbalized understanding and agreement.  Discussed that chantix is last choice for smoking cessation in pregnancy due to the lack of data available on safety.   Pt will try small amount of NRT to see if that helps her and go from there.    Next visit: 2 weeks    Reviewed:   Prenatal visit schedule  2nd trimester precautions and expectations   labor precautions  Danger signs      I have spent 20 minutes total time on the day of the encounter, including: preparing to see the patient, ordering/reviewing labs, performing a medically appropriate exam, and providing care coordination. face to face counseling, chart review, orders and coordination of care    Pt verbalized understanding. All questions answered. No barriers to learning identified

## 2024-03-22 NOTE — TELEPHONE ENCOUNTER
Patient verified name and     Patient went to bathroom and saw speck of red blood on undergarment. Patient does report she shaved this morning, unsure if she has a skin laceration is unable to look closely. Denies pelvic cramping/pain. Reports + FM. Discussed with patient, if symptoms are persistent with skin laceration she should not continue to bleed. If she does notice bleeding we would recommend for her to call us back. Verbalized understanding and agreed.

## 2024-04-08 ENCOUNTER — ROUTINE PRENATAL (OUTPATIENT)
Dept: OBGYN CLINIC | Facility: CLINIC | Age: 39
End: 2024-04-08

## 2024-04-08 ENCOUNTER — LAB ENCOUNTER (OUTPATIENT)
Dept: LAB | Facility: HOSPITAL | Age: 39
End: 2024-04-08
Attending: ADVANCED PRACTICE MIDWIFE
Payer: MEDICAID

## 2024-04-08 VITALS
HEART RATE: 72 BPM | DIASTOLIC BLOOD PRESSURE: 67 MMHG | WEIGHT: 198 LBS | SYSTOLIC BLOOD PRESSURE: 100 MMHG | BODY MASS INDEX: 30 KG/M2

## 2024-04-08 DIAGNOSIS — O09.529 SUPERVISION OF HIGH-RISK PREGNANCY OF ELDERLY MULTIGRAVIDA (HCC): Primary | ICD-10-CM

## 2024-04-08 DIAGNOSIS — Z34.83 ENCOUNTER FOR SUPERVISION OF OTHER NORMAL PREGNANCY IN THIRD TRIMESTER (HCC): ICD-10-CM

## 2024-04-08 DIAGNOSIS — Z87.891 HISTORY OF SMOKING: ICD-10-CM

## 2024-04-08 LAB
DEPRECATED RDW RBC AUTO: 42.5 FL (ref 35.1–46.3)
ERYTHROCYTE [DISTWIDTH] IN BLOOD BY AUTOMATED COUNT: 12.6 % (ref 11–15)
GLUCOSE 1H P GLC SERPL-MCNC: 94 MG/DL
HCT VFR BLD AUTO: 32.5 %
HGB BLD-MCNC: 11.4 G/DL
MCH RBC QN AUTO: 32.4 PG (ref 26–34)
MCHC RBC AUTO-ENTMCNC: 35.1 G/DL (ref 31–37)
MCV RBC AUTO: 92.3 FL
PLATELET # BLD AUTO: 254 10(3)UL (ref 150–450)
RBC # BLD AUTO: 3.52 X10(6)UL
T PALLIDUM AB SER QL IA: NONREACTIVE
WBC # BLD AUTO: 8.8 X10(3) UL (ref 4–11)

## 2024-04-08 PROCEDURE — 99213 OFFICE O/P EST LOW 20 MIN: CPT | Performed by: ADVANCED PRACTICE MIDWIFE

## 2024-04-08 PROCEDURE — 90471 IMMUNIZATION ADMIN: CPT | Performed by: ADVANCED PRACTICE MIDWIFE

## 2024-04-08 PROCEDURE — 36415 COLL VENOUS BLD VENIPUNCTURE: CPT

## 2024-04-08 PROCEDURE — 85027 COMPLETE CBC AUTOMATED: CPT

## 2024-04-08 PROCEDURE — 86780 TREPONEMA PALLIDUM: CPT

## 2024-04-08 PROCEDURE — 82950 GLUCOSE TEST: CPT

## 2024-04-08 PROCEDURE — 90715 TDAP VACCINE 7 YRS/> IM: CPT | Performed by: ADVANCED PRACTICE MIDWIFE

## 2024-04-08 PROCEDURE — 87389 HIV-1 AG W/HIV-1&-2 AB AG IA: CPT

## 2024-04-08 NOTE — PROGRESS NOTES
Patient presents for routine OB visit. Overall, feeling well. Patient reports active fetus. Denies loss of fluid, vaginal bleeding, and contractions.    Patient reports not smoking since last visit 3/21. Is not currently using nicotine replacement therapy and feels like she is managing well.    3rd trimester labs completed today. Results not available.    Growth ultrasound scheduled may 6th    Tdap today    Reviewed  labor precautions  Reviewed Kick counts  Reviewed warning signs    RTC 2 weeks    Assessment and Plan completed by CAMI Barbour under the direct supervision of Sandie Potts CNM

## 2024-04-23 ENCOUNTER — ROUTINE PRENATAL (OUTPATIENT)
Dept: OBGYN CLINIC | Facility: CLINIC | Age: 39
End: 2024-04-23

## 2024-04-23 VITALS
DIASTOLIC BLOOD PRESSURE: 75 MMHG | WEIGHT: 200 LBS | HEART RATE: 99 BPM | SYSTOLIC BLOOD PRESSURE: 113 MMHG | BODY MASS INDEX: 30 KG/M2

## 2024-04-23 DIAGNOSIS — Z34.93 PRENATAL CARE IN THIRD TRIMESTER (HCC): Primary | ICD-10-CM

## 2024-04-23 PROCEDURE — 99213 OFFICE O/P EST LOW 20 MIN: CPT | Performed by: ADVANCED PRACTICE MIDWIFE

## 2024-04-23 NOTE — PATIENT INSTRUCTIONS
Kick Counts  It’s normal to worry about your baby’s health. One way you can know your baby’s doing well is to record the baby’s movements once a day. This is called a kick count.   Remember to take your kick count records to all your appointments with your healthcare provider.  How to count kicks    Time how long it takes you to feel 10 kicks, flutters, swishes, or rolls. Ideally, you want to feel at least 10 movements in 2 hours. You will likely feel 10 movements in less time than that.  Starting at 28 weeks, count your baby's movements daily. Follow your healthcare provider's instructions for kick counting. Here are tips for counting kicks:  Choose a time when the baby is active, such as after a meal.   Sit comfortably or lie on your side.   The first time the baby moves, write down the time.   Count each movement until the baby has moved  10 times. This can take from 20 minutes to 2 hours.   If you haven't felt 10 kicks by the end of the second hour, wait a few hours. Then try again.  Try to do it at the same time each day.  When to call your healthcare provider  Call your healthcare provider  right away if:  You do a couple sets of kick counts during the day and your baby moves fewer than 10 times in 2 hours.  Your baby moves much less often than on the days before.  You haven't felt your baby move all day.  Virtual[employer] last reviewed this educational content on 2020    © 3004-2772 [employer]. All rights reserved. This information is not intended as a substitute for professional medical care. Always follow your healthcare professional's instructions. Premature Labor    Premature labor ( labor) is when symptoms of labor occur before 37 weeks of pregnancy. (This is 3 weeks before your due date.) Premature labor can lead to premature delivery. This means giving birth to your baby early. Babies need at least 37 weeks of pregnancy for all the organs to develop normally. The earlier the  delivery, the greater the risks to the baby.  In most cases, the cause of premature labor is unknown. But certain factors may make the problem more likely. These include:  History of premature labor with other pregnancies  Smoking  Alcohol or substance abuse  Low pre-pregnancy weight or weight gain during pregnancy  Short time period between pregnancies  Being pregnant with twins, triplets, or more  History of certain types of surgery on the cervix or uterus  Having a short cervix  Certain infections  There are a number of other risk factors. Ask your healthcare provider to help you understand the risk factors specific to your case. Then find out what you can do to control or reduce them.  Contractions are one of the main signs of premature labor. A contraction is different from cramping. It may feel painful and the belly (abdomen) may get hard. It can last from a few seconds to a few minutes. Some women may feel only a sense of pressure in the belly, thighs, rectum, or vagina. Some may feel only the hardening of the uterus without pain or pressure. Or there may be a constant pain in the lower back, which spreads forward toward the belly.Premature labor is often treated with medicines. A hospital stay may be needed. If labor doesn't progress and you and your baby are both healthy, you may be discharged to continue care at home.  Home care  Ask your provider any questions you have. Be certain you understand how to care for yourself at home. Also follow all recommendations given by your healthcare providers.  Learn the signs of premature labor. Watch for these signs when you get home.  Limit or restrict activities as advised. This may include stopping certain physical activities and cutting back hours at work.  Avoid doing strenuous work as directed by your provider. Ask family and friends for help with tasks and support at home, if needed.  Don’t smoke, drink alcohol, or use other harmful substances.  Take steps to  reduce stress.  Report any unusual symptoms to your provider.    Follow-up care  Follow up with your healthcare provider, or as directed. Weekly visits with your provider may be needed.  When to seek medical advice  Call your healthcare provider right away if any of these occur:  Regular or frequent contractions, whether they are painful or not  Pressure in the pelvis  Pressure in the lower belly or mild cramping in your belly with or without diarrhea  Constant low, dull backache  Gush or slow leaking of water from your vagina  Change in vaginal discharge (watery, mucus, or bloody)  Any vaginal bleeding  Decreased movement of your baby  Shun last reviewed this educational content on 2018 [employer]. All rights reserved. This information is not intended as a substitute for professional medical care. Always follow your healthcare professional's instructions.     Understanding Preeclampsia  Preeclampsia is a condition that can happen in pregnancy. It includes high blood pressure (hypertension), swelling, and signs of organ problems. It can show up around week 20 of pregnancy. It often goes away by 12 weeks after you give birth. It can lead to serious health risks for you and your baby. During your pregnancy, your healthcare provider will watch your blood pressure.      Your blood pressure will be monitored regularly throughout your pregnancy to help check for preeclampsia.     Dangers of preeclampsia   If not treated, preeclampsia can cause problems for you and your baby. The placenta is the organ that nourishes your baby. It may tear away from the wall of the uterus. This can put the baby at risk for health problems (fetal distress). It can put the baby at risk for  birth. Preeclampsia can also cause these health problems in you:   Kidney failure or other organ damage  Seizures  Stroke  Who’s at risk for preeclampsia?   No one knows what causes preeclampsia. It can happen  in any pregnant person. But there are things that increase your risk. You may need to take a daily low dose of aspirin if you are at risk for preeclampsia.   You’re at higher risk for preeclampsia if you have any of these:  Diabetes  High blood pressure  Obesity  Kidney disease  Autoimmune disease such as lupus  A family history of preeclampsia  You’re at higher risk if any of these apply to you:  This is your first pregnancy  You are having twins or more  You’re under age 18 or over age 40  You used in vitro fertilization  You are Black  And you’re at higher risk if you had any of these in a past pregnancy:   Preeclampsia  Intrauterine growth retardation (IUGR)   birth  Placental abruption  Fetal death  Symptoms  A common symptom of preeclampsia is high blood pressure. Other symptoms may include:   Fast weight gain  Protein in your urine  Headache  Belly (abdominal) pain on your right side  Vision problems such as flashes or spots  Swelling (edema) in your face or hands (this often happens near the end of a normal pregnancy)  Tests you may have   Your healthcare provider will want to check your blood pressure. This will need to be done often in your pregnancy. If your blood pressure is high, you may have these tests:   Urine tests to look for protein  Blood tests to confirm preeclampsia  Fetal monitoring to make sure that your baby is healthy  Treating preeclampsia   Preeclampsia almost always ends soon after you give birth. Until then, your healthcare provider can help you manage it.   If your symptoms are mild, you may to:     Limit your activity  Rest in bed  Not do heavy lifting    If your symptoms are severe, you will stay in the hospital. Treatment here may include:   Limits to your activity. This is to help control your blood pressure. You should not lift anything heavy. You will need to spend 8 hours a day lying down with your feet up.  Magnesium IV (intravenous) drip. This is done during labor. It's  to prevent seizures  Induced labor or  section. Birth of the baby is considered the cure for preeclampsia.  When to call your healthcare provider   Call your healthcare provider if your symptoms start quickly or are severe. This includes swelling, weight gain, or other symptoms. Some cases of preeclampsia are more severe than others. Your symptoms also may change or get worse as you get closer to your due date.   Once you give birth   In most cases, preeclampsia goes away on its own soon after you give birth. This is often by the 12th week after you deliver. Within days after you give birth, your blood pressure, swelling, and other symptoms should get better. But for some people, problems from preeclampsia can continue after birth.   Postpartum preeclampsia   Preeclampsia that starts after birth is rare. There are 2 types:   Postpartum preeclampsia. This may start in the first 48 hours after birth.  Late-onset preeclampsia. This starts more than 48 hours after birth.  Both of these types are rare. But call your healthcare provider right away if you have symptoms of preeclampsia after you give birth.   Shun last reviewed this educational content on 10/1/2021    © 0846-6192 [employer]. All rights reserved. This information is not intended as a substitute for professional medical care. Always follow your healthcare professional's instructions.

## 2024-04-23 NOTE — PROGRESS NOTES
Here for EVY visit.      No LMP recorded (approximate). Patient is pregnant. 2024, by Ultrasound 30w1d     Baby active. Denies contractions, LOF or bleeding    Tdap complete    Has growth us scheduled.    Recs for delivery 39-40 wks d/t AMA d/t increased stillbirth risks.     Fetal kick counts, warning signs and signs PTL reviewed.      My total time spent caring for the patient on the day of the encounter:  20 minutes, which included: chart review, exam, care coordination, charting, and counseling as per above.

## 2024-05-06 ENCOUNTER — ROUTINE PRENATAL (OUTPATIENT)
Dept: OBGYN CLINIC | Facility: CLINIC | Age: 39
End: 2024-05-06

## 2024-05-06 ENCOUNTER — HOSPITAL ENCOUNTER (OUTPATIENT)
Dept: PERINATAL CARE | Facility: HOSPITAL | Age: 39
Discharge: HOME | End: 2024-05-06
Attending: OBSTETRICS & GYNECOLOGY
Payer: MEDICAID

## 2024-05-06 ENCOUNTER — HOSPITAL ENCOUNTER (OUTPATIENT)
Dept: PERINATAL CARE | Facility: HOSPITAL | Age: 39
End: 2024-05-06
Attending: OBSTETRICS & GYNECOLOGY
Payer: MEDICAID

## 2024-05-06 VITALS
BODY MASS INDEX: 30 KG/M2 | WEIGHT: 200 LBS | SYSTOLIC BLOOD PRESSURE: 108 MMHG | HEART RATE: 111 BPM | DIASTOLIC BLOOD PRESSURE: 73 MMHG

## 2024-05-06 VITALS
SYSTOLIC BLOOD PRESSURE: 96 MMHG | WEIGHT: 202 LBS | BODY MASS INDEX: 31 KG/M2 | HEART RATE: 93 BPM | DIASTOLIC BLOOD PRESSURE: 65 MMHG

## 2024-05-06 DIAGNOSIS — O09.523 MULTIGRAVIDA OF ADVANCED MATERNAL AGE IN THIRD TRIMESTER (HCC): Primary | ICD-10-CM

## 2024-05-06 DIAGNOSIS — Z34.93 PRENATAL CARE IN THIRD TRIMESTER (HCC): Primary | ICD-10-CM

## 2024-05-06 DIAGNOSIS — O09.523 MULTIGRAVIDA OF ADVANCED MATERNAL AGE IN THIRD TRIMESTER (HCC): ICD-10-CM

## 2024-05-06 PROCEDURE — 76816 OB US FOLLOW-UP PER FETUS: CPT | Performed by: OBSTETRICS & GYNECOLOGY

## 2024-05-06 PROCEDURE — 76819 FETAL BIOPHYS PROFIL W/O NST: CPT

## 2024-05-06 NOTE — PROGRESS NOTES
Outpatient Maternal-Fetal Medicine Consultation    Dear Ms. Cisneros    Thank you for requesting ultrasound evaluation and maternal fetal medicine consultation on your patient Merle Bell.  As you are aware she is a 39 year old female  with a singletonpregnancy and an Estimated Date of Delivery: 24.  A maternal-fetal medicine f/u is today.  Her prenatal records and labs were reviewed.    Passed glucose screen.  38 weeks and approximately 8 pounds.  HISTORY  # 1 - Date: 11, Sex: Male, Weight: 8 lb (3.629 kg), GA: 39w0d, Type: Vaginal, Spontaneous, Apgar1: None, Apgar5: None, Living: Living, Birth Comments: None    # 2 - Date: 12, Sex: Male, Weight: 8 lb 3 oz (3.714 kg), GA: 38w0d, Type: Vaginal, Spontaneous, Apgar1: None, Apgar5: None, Living: Living, Birth Comments: None    # 3 - Date: , Sex: None, Weight: None, GA: 5w0d, Type: None, Apgar1: None, Apgar5: None, Living: None, Birth Comments: None    # 4 - Date: None, Sex: None, Weight: None, GA: None, Type: None, Apgar1: None, Apgar5: None, Living: None, Birth Comments: None      Past Medical History  The patient  has no past medical history on file.    Past Surgical History  The patient  has no past surgical history on file.    Family History  The patient She indicated that the status of her father is unknown.      Medications:   Current Outpatient Medications:     Prenatal Vit-Fe Fumarate-FA (MULTI PRENATAL) 27-0.8 MG Oral Tab, Take by mouth., Disp: , Rfl:   Allergies: Not on File    PHYSICAL EXAMINATION:  /73   Pulse 111   Wt 200 lb (90.7 kg)   LMP  (Approximate)   BMI 30.41 kg/m²   General: alert and oriented,no acute distress  Abdomen: gravid, soft, non-tender      OBSTETRIC ULTRASOUND  The patient had a growth ultrasound today which revealed size consistent with dates.  Ultrasound Findings:  Single IUP in cephalic  presentation.    Placenta is posterior.   A 3 vessel cord is noted.  Cardiac activity is present at 137  bpm  EFW 2333 g ( 5 lb 2 oz); 76%.    EMILY is  12.1 cm.  MVP is 4.7 cm  BPP is 8/8.     The fetal measurements are consistent with established EDC. No gross ultrasound evidence of structural abnormalities are seen today. The patient understands that ultrasound cannot rule out all structural and chromosomal abnormalities.     See PACS/Imaging Tab For Complete Ultrasound Report  I interpreted the results and reviewed them with the patient.    DISCUSSION  During her visit we discussed and reviewed the following issues:  ADVANCED MATERNAL AGE    Background  I reviewed with the patient that pregnancies in women of advanced maternal age (35 or older at delivery) are associated with elevated risks.  Specifically, there is a higher rate of:    Fetal malformations  Preeclampsia  Gestational diabetes  Intrauterine fetal death   Please see previous MFM detailed discussion.     The patient has already obtained a low-risk  NPIT result and was appropriately reassured.  -Per patient report, obtained with prior provider      GLUCOSE 1HR OB   Date Value Ref Range Status   04/08/2024 94 See comment mg/dL Final     Comment:     If the plasma glucose level measured after 1 hour is >=130, 135 or 140 mg/dl proceed to \"Glucose Tolerance, 100 gm (0 hr, 1 hr, 2hr, 3hr), Gestational (ADA)\" test on a separate day, as clinically indicated.           IMPRESSION:  IUP at 32w0d   AMA: low-risk cell free fetal DNA, declined invasive testing  BPP 8/8    RECOMMENDATIONS:  Continue care with Ms. Cisneros  Weekly NST's at 32 weeks.  Twice weekly testing at 38 weeks - weekly NST and weekly BPP.  Delivery at 39-40 weeks.    Thank you for allowing me to participate in the care of your patient.  Please do not hesitate to contact me if additional questions or concerns arise.      Shalini Bell MD     20  minutes spent in review of records, patient consultation, documentation and coordination of care.  The relevant clinical matter(s) are summarized above.      Note to patient and family  The 21st Century Cures Act makes medical notes available to patients in the interest of transparency.  However, please be advised that this is a medical document.  It is intended as jshc-tf-akar communication.  It is written and medical language may contain abbreviations or verbiage that are technical and unfamiliar.  It may appear blunt or direct.  Medical documents are intended to carry relevant information, facts as evident, and the clinical opinion of the practitioner.

## 2024-05-06 NOTE — PROGRESS NOTES
Feeling well. Endorses regular fetal movement. Denies contractions, LOF, vaginal bleeding.     Had normal growth ultrasound today    Plan:  NST starting next week. Has scheduled.  EVY 2 weeks    Reviewed third trimester and  labor warning signs and when to call.

## 2024-05-13 ENCOUNTER — NST DOCUMENTATION (OUTPATIENT)
Dept: OBGYN CLINIC | Facility: CLINIC | Age: 39
End: 2024-05-13

## 2024-05-13 ENCOUNTER — HOSPITAL ENCOUNTER (OUTPATIENT)
Dept: PERINATAL CARE | Facility: HOSPITAL | Age: 39
End: 2024-05-13
Attending: ADVANCED PRACTICE MIDWIFE
Payer: MEDICAID

## 2024-05-13 VITALS — HEART RATE: 90 BPM | SYSTOLIC BLOOD PRESSURE: 103 MMHG | DIASTOLIC BLOOD PRESSURE: 68 MMHG

## 2024-05-13 DIAGNOSIS — O09.523 MULTIGRAVIDA OF ADVANCED MATERNAL AGE IN THIRD TRIMESTER (HCC): Primary | ICD-10-CM

## 2024-05-13 DIAGNOSIS — O09.523 MULTIGRAVIDA OF ADVANCED MATERNAL AGE IN THIRD TRIMESTER (HCC): ICD-10-CM

## 2024-05-13 DIAGNOSIS — Z87.891 HISTORY OF SMOKING: Primary | ICD-10-CM

## 2024-05-13 PROCEDURE — 59025 FETAL NON-STRESS TEST: CPT

## 2024-05-13 PROCEDURE — 59025 FETAL NON-STRESS TEST: CPT | Performed by: ADVANCED PRACTICE MIDWIFE

## 2024-05-13 NOTE — ADDENDUM NOTE
Encounter addended by: Karlene Madrigal RN on: 5/13/2024 3:31 PM   Actions taken: Flowsheet accepted

## 2024-05-13 NOTE — NST
Nonstress Test   Patient: Merle Bell    Gestation: 33w0d    Diagnosis from order:  ama    Problem List:   Patient Active Problem List   Diagnosis    Advanced maternal age in multigravida ([facility])    Anxiety    Depression during pregnancy in second trimester ([facility])    History of smoking       NST:        2024   NST DOCUMENTATION   Variability 6-25 BPM   Accelerations Yes   Decelerations None   Baseline 135 BPM   Uterine Irritability No   Contractions Not present   Acoustic Stimulator No   Nonstress Test Interpretation Reactive   NST Completed by SHRUTHI Petit   Disposition Home    Testing Plan Weekly NST   Provider Notified [facility]            I agree with the above evaluation. NST completed.  Zuly Clark CNM  2024  2:45 PM

## 2024-05-21 ENCOUNTER — NST DOCUMENTATION (OUTPATIENT)
Dept: OBGYN CLINIC | Facility: CLINIC | Age: 39
End: 2024-05-21

## 2024-05-21 ENCOUNTER — HOSPITAL ENCOUNTER (OUTPATIENT)
Facility: HOSPITAL | Age: 39
Discharge: HOME | End: 2024-05-21
Attending: ADVANCED PRACTICE MIDWIFE | Admitting: OBSTETRICS & GYNECOLOGY

## 2024-05-21 VITALS — SYSTOLIC BLOOD PRESSURE: 120 MMHG | DIASTOLIC BLOOD PRESSURE: 75 MMHG | HEART RATE: 65 BPM

## 2024-05-21 DIAGNOSIS — Z34.90 PREGNANCY (HCC): ICD-10-CM

## 2024-05-21 DIAGNOSIS — O09.523 MULTIGRAVIDA OF ADVANCED MATERNAL AGE IN THIRD TRIMESTER (HCC): Primary | ICD-10-CM

## 2024-05-21 PROCEDURE — 59025 FETAL NON-STRESS TEST: CPT

## 2024-05-21 PROCEDURE — 59025 FETAL NON-STRESS TEST: CPT | Performed by: ADVANCED PRACTICE MIDWIFE

## 2024-05-22 NOTE — PROGRESS NOTES
Pt is a 39 year old female admitted to TR1/TR1-A.     Chief Complaint   Patient presents with    Non-stress Test      Pt is  34w1d intra-uterine pregnancy.  History obtained, consents signed. Oriented to room, staff, and plan of care.

## 2024-05-22 NOTE — NST
Nonstress Test   Patient: Merle Bell    Gestation: 34w1d    Diagnosis from order: Advanced maternal age    Problem List:   Patient Active Problem List   Diagnosis    Advanced maternal age in multigravida (HCC)    Anxiety    Depression during pregnancy in second trimester ([facility])    History of smoking       NST:        2024   NST DOCUMENTATION   Variability 6-25 BPM   Accelerations Yes   Decelerations None   Baseline 135 BPM   Uterine Irritability Yes   Contractions Not present   Contraction Frequency none   Acoustic Stimulator No   Nonstress Test Interpretation Reactive   Nonstress Test Second Interpretation Reactive   FHR Category Category I   Comments NST for AMA   NST Completed by Chapin HAWKINS   [facility] home    Testing Plan Weekly NST   Provider Notified Ham MILNER            I agree with the above evaluation. NST completed.  Dolores Nicole CNM  2024  8:53 PM

## 2024-05-27 ENCOUNTER — NST DOCUMENTATION (OUTPATIENT)
Dept: OBGYN CLINIC | Facility: CLINIC | Age: 39
End: 2024-05-27

## 2024-05-27 ENCOUNTER — HOSPITAL ENCOUNTER (OUTPATIENT)
Facility: HOSPITAL | Age: 39
Discharge: HOME | End: 2024-05-27
Attending: ADVANCED PRACTICE MIDWIFE | Admitting: OBSTETRICS & GYNECOLOGY

## 2024-05-27 VITALS — DIASTOLIC BLOOD PRESSURE: 76 MMHG | HEART RATE: 76 BPM | SYSTOLIC BLOOD PRESSURE: 115 MMHG

## 2024-05-27 DIAGNOSIS — O26.93 PREGNANCY RELATED CONDITION IN THIRD TRIMESTER (HCC): ICD-10-CM

## 2024-05-27 PROCEDURE — 59025 FETAL NON-STRESS TEST: CPT

## 2024-05-27 NOTE — NST
Nonstress Test   Patient: Merle Bell    Gestation: 35w0d    Diagnosis from order:  Advanced maternal age    Problem List:   Patient Active Problem List   Diagnosis    Advanced maternal age in multigravida (HCC)    Anxiety    Depression during pregnancy in second trimester ([facility])    History of smoking       NST:        2024   NST DOCUMENTATION   Variability 6-25 BPM   Accelerations Yes   Decelerations None   Baseline 135 BPM   Uterine Irritability Yes   Contractions Not present   Contraction Frequency none   Acoustic Stimulator No   Nonstress Test Interpretation Reactive   Nonstress Test Second Interpretation Reactive   FHR Category Category I   Comments NST for AMA   NST Completed by Chapin HAWKINS   [facility] home    Testing Plan Weekly NST   Provider Notified Ham MILNER            I agree with the above evaluation. NST completed.  Dolores Nicole CNM  2024  10:28 AM

## 2024-05-28 ENCOUNTER — PATIENT MESSAGE (OUTPATIENT)
Dept: OBGYN CLINIC | Facility: CLINIC | Age: 39
End: 2024-05-28

## 2024-05-28 ENCOUNTER — ROUTINE PRENATAL (OUTPATIENT)
Dept: OBGYN CLINIC | Facility: CLINIC | Age: 39
End: 2024-05-28

## 2024-05-28 VITALS
WEIGHT: 205 LBS | SYSTOLIC BLOOD PRESSURE: 116 MMHG | BODY MASS INDEX: 31 KG/M2 | DIASTOLIC BLOOD PRESSURE: 80 MMHG | HEART RATE: 75 BPM

## 2024-05-28 DIAGNOSIS — O09.529 ANTEPARTUM MULTIGRAVIDA OF ADVANCED MATERNAL AGE (HCC): Primary | ICD-10-CM

## 2024-05-28 PROCEDURE — 99213 OFFICE O/P EST LOW 20 MIN: CPT | Performed by: ADVANCED PRACTICE MIDWIFE

## 2024-05-28 NOTE — TELEPHONE ENCOUNTER
Pt had PN visit with Zuly today. Per pt, Zuly mentioned possible IOL at 38 weeks. Please advise.

## 2024-05-28 NOTE — PROGRESS NOTES
Active fetus Increasing BH contractions. Denies any leaking of fluid or bleeding.  Desires SVE external os 1 internal os closed cervix is long head no engaged. Assurance given  Reviewed pain management in labor  Desires unmedicated will try NO. Has antenatl testing appt  Reviewed M recommendation for IOL at 39 weeks pt agrees with plan. Pt denies any smoking. Reviewed S&S labor  Warning signs reviewed  All questions answered.     Total time spent 20 minutes this calendar day which includes preparing to see the patient including chart review, obtaining and/or reviewing additional medical history, performing a physical exam and evaluation, documenting clinical information in the electronic medical record, independently interpreting results, counseling the patient, communicating results to the patient/family/caregiver and coordinating care.

## 2024-05-29 NOTE — TELEPHONE ENCOUNTER
Please contact patient and let her know I sent a note to Dr Bell indicating that if it is medically advised she would like delivery between 38-39 weeks

## 2024-05-31 ENCOUNTER — NST DOCUMENTATION (OUTPATIENT)
Dept: OBGYN CLINIC | Facility: CLINIC | Age: 39
End: 2024-05-31

## 2024-05-31 DIAGNOSIS — O09.523 ADVANCED MATERNAL AGE IN MULTIGRAVIDA, THIRD TRIMESTER (HCC): Primary | ICD-10-CM

## 2024-05-31 NOTE — NST
Nonstress Test   Patient: Merle Bell    Gestation: 35w4d    Diagnosis from order:   AMA    Problem List:   Patient Active Problem List   Diagnosis    Advanced maternal age in multigravida (HCC)    Anxiety    Depression during pregnancy in second trimester ([facility])    History of smoking       NST: Reactive NST on 24   NST DOCUMENTATION   Variability 6-25 BPM   Accelerations Yes   Decelerations None   Baseline 135 BPM   Uterine Irritability No   Contractions Irregular   Contraction Frequency 3-5   Acoustic Stimulator No   Nonstress Test Interpretation Reactive   Nonstress Test Second Interpretation Reactive   Comments NST for AMA   NST Completed by CHETNA RODRIGUEZ RN   [facility] home    Testing Plan Weekly NST   Provider Notified YE MILNER            I agree with the above evaluation. NST completed.  Abi Cisneros CNM  2024  2:23 PM

## 2024-06-03 ENCOUNTER — NST DOCUMENTATION (OUTPATIENT)
Dept: OBGYN CLINIC | Facility: CLINIC | Age: 39
End: 2024-06-03

## 2024-06-03 ENCOUNTER — ROUTINE PRENATAL (OUTPATIENT)
Dept: OBGYN CLINIC | Facility: CLINIC | Age: 39
End: 2024-06-03

## 2024-06-03 ENCOUNTER — HOSPITAL ENCOUNTER (OUTPATIENT)
Dept: PERINATAL CARE | Facility: HOSPITAL | Age: 39
End: 2024-06-03
Attending: ADVANCED PRACTICE MIDWIFE
Payer: MEDICAID

## 2024-06-03 ENCOUNTER — PATIENT MESSAGE (OUTPATIENT)
Dept: OBGYN CLINIC | Facility: CLINIC | Age: 39
End: 2024-06-03

## 2024-06-03 VITALS
DIASTOLIC BLOOD PRESSURE: 81 MMHG | BODY MASS INDEX: 31 KG/M2 | SYSTOLIC BLOOD PRESSURE: 131 MMHG | HEART RATE: 77 BPM | WEIGHT: 205 LBS

## 2024-06-03 DIAGNOSIS — O09.523 MULTIGRAVIDA OF ADVANCED MATERNAL AGE IN THIRD TRIMESTER (HCC): Primary | ICD-10-CM

## 2024-06-03 DIAGNOSIS — O09.529 ADVANCED MATERNAL AGE IN MULTIGRAVIDA (HCC): Primary | ICD-10-CM

## 2024-06-03 DIAGNOSIS — O09.523 ADVANCED MATERNAL AGE IN MULTIGRAVIDA, THIRD TRIMESTER (HCC): Primary | ICD-10-CM

## 2024-06-03 PROCEDURE — 99212 OFFICE O/P EST SF 10 MIN: CPT | Performed by: ADVANCED PRACTICE MIDWIFE

## 2024-06-03 PROCEDURE — 59025 FETAL NON-STRESS TEST: CPT | Performed by: ADVANCED PRACTICE MIDWIFE

## 2024-06-03 PROCEDURE — 59025 FETAL NON-STRESS TEST: CPT

## 2024-06-03 NOTE — NST
Nonstress Test   Patient: Merle Bell    Gestation: 36w0d    Diagnosis from order:  ama    Problem List: reactive  Patient Active Problem List   Diagnosis    Advanced maternal age in multigravida ([facility])    Anxiety    Depression during pregnancy in second trimester ([facility])    History of smoking       NST:        6/3/2024   NST DOCUMENTATION   Variability 6-25 BPM   Accelerations Yes   Decelerations None   Baseline 135 BPM   Uterine Irritability No   Contractions Irregular   Acoustic Stimulator No   Nonstress Test Interpretation Reactive   NST Completed by Yariel   [facility] Home    Testing Plan Weekly NST   Provider Notified Eduardo MILNER            I agree with the above evaluation. NST completed.  Zuly Clark CNM  6/3/2024  4:13 PM

## 2024-06-03 NOTE — PROGRESS NOTES
40 y/o  36w0d here for a visit.     Pt denies reports + FM, no LOF, no bleeding. C/O that she felt UC's last week and the provider checked her and she was not dilated. Educated to call provider if she has bleeding, painful UC's, decreased FM. GBS collected and pt examined closed cervix. Talked to her about IOL due to AMA at 39 or 40 weeks.  Appointments now weekly.

## 2024-06-04 LAB — GROUP B STREP BY PCR FOR PCR OVT: POSITIVE

## 2024-06-04 NOTE — PROGRESS NOTES
Will find a date for IOL between 38-39 weeks. Soft very posterior.   Patient seen in conjunction with SAPN under my direct supervision. I was present for history, physical, assessment and plan. I agree with documentation per SAPN.

## 2024-06-05 ENCOUNTER — TELEPHONE (OUTPATIENT)
Dept: OBGYN CLINIC | Facility: CLINIC | Age: 39
End: 2024-06-05

## 2024-06-05 NOTE — TELEPHONE ENCOUNTER
From: Merle Bell  To: Zuly Clark  Sent: 6/3/2024 5:55 PM CDT  Subject: Induction Date    Hello! I had my 36 week appt today and was told to send a date and time for induction! Can we possibly do the 17th of June in the morning?     Thank you!

## 2024-06-05 NOTE — TELEPHONE ENCOUNTER
IOL scheduled 06/24 @ 9 am.    Attempted to laura pt to discuss but no answer. Left message to call clinic.     RN's- if pt calls back let her know of scheduled IOL. MES talked with Dr Bell who does not recommend IOL at 38 wks as no indication at this time. So earliest we can induce her is 39 wks so scheduled on 06/24.     Thanks MJ

## 2024-06-10 PROBLEM — B95.1 GROUP B STREPTOCOCCAL INFECTION DURING PREGNANCY (HCC): Status: ACTIVE | Noted: 2024-06-10

## 2024-06-10 PROBLEM — O98.819 GROUP B STREPTOCOCCAL INFECTION DURING PREGNANCY (HCC): Status: ACTIVE | Noted: 2024-06-10

## 2024-06-10 PROBLEM — Z34.90 PREGNANT (HCC): Status: ACTIVE | Noted: 2024-06-10

## 2024-06-10 NOTE — PROGRESS NOTES
LV 4/3/24 WITH CC FOR DM NV 8/5/24   Merle Bell is a 39 year old  pt at 37w1d here for EVY  She is feeling uncomfortable  How is anxiety/depression is a lot better  Seeing therapist weekly  Denies any smoking. Not using Nicotine replacement patches  Birth plan reviewed    ROS:  Having contractions but has not timed them. Did have some bleeding when wiped after sex, and has been having leaking of fluid for a couple of days, but not today.  Fetus is active.    Vitals:    24 1437   BP: 117/82   Pulse: 82   Weight: 207 lb (93.9 kg)      See flowsheet  TW lbs (15-25 lb TWG)  S/p Tdap  3T labs drawn  @ 28.0  GBS positive    growth US:  76%    Assessment/Plan:  IUP at 37w0d  1. Prenatal care in third trimester ([facility])    2. Multigravida of advanced maternal age in third trimester ([facility])    3. Depression during pregnancy in second trimester ([facility])    4. Group B streptococcal infection during pregnancy ([facility])       Reviewed:  Labor precautions  Kick counts  Danger Signs/PreE s/s  Twice weekly testing at 38 weeks - weekly NST and weekly BPP.   IOL sched for  at 0900  To triage for r/o SROM.  RN Anjali  RTC 1 wk(s)    I have spent 20 minutes total time on the day of the encounter, including: preparing to see the patient, ordering/reviewing labs, performing a medically appropriate exam, and providing care coordination. face to face counseling, chart review, orders and coordination of care     Pt verbalized understanding.  All questions answered.  No barriers to learning identified

## 2024-06-11 ENCOUNTER — HOSPITAL ENCOUNTER (OUTPATIENT)
Facility: HOSPITAL | Age: 39
Discharge: HOME | End: 2024-06-11
Attending: ADVANCED PRACTICE MIDWIFE | Admitting: OBSTETRICS & GYNECOLOGY
Payer: MEDICAID

## 2024-06-11 ENCOUNTER — ROUTINE PRENATAL (OUTPATIENT)
Dept: OBGYN CLINIC | Facility: CLINIC | Age: 39
End: 2024-06-11

## 2024-06-11 ENCOUNTER — HOSPITAL ENCOUNTER (OUTPATIENT)
Dept: PERINATAL CARE | Facility: HOSPITAL | Age: 39
Discharge: HOME | End: 2024-06-11
Attending: ADVANCED PRACTICE MIDWIFE
Payer: MEDICAID

## 2024-06-11 VITALS
SYSTOLIC BLOOD PRESSURE: 117 MMHG | BODY MASS INDEX: 31 KG/M2 | WEIGHT: 207 LBS | DIASTOLIC BLOOD PRESSURE: 82 MMHG | HEART RATE: 82 BPM

## 2024-06-11 DIAGNOSIS — O99.342: ICD-10-CM

## 2024-06-11 DIAGNOSIS — O09.523 MULTIGRAVIDA OF ADVANCED MATERNAL AGE IN THIRD TRIMESTER (HCC): ICD-10-CM

## 2024-06-11 DIAGNOSIS — O98.819 GROUP B STREPTOCOCCAL INFECTION DURING PREGNANCY (HCC): ICD-10-CM

## 2024-06-11 DIAGNOSIS — F32.A: ICD-10-CM

## 2024-06-11 DIAGNOSIS — Z34.93 PRENATAL CARE IN THIRD TRIMESTER (HCC): Primary | ICD-10-CM

## 2024-06-11 DIAGNOSIS — B95.1 GROUP B STREPTOCOCCAL INFECTION DURING PREGNANCY (HCC): ICD-10-CM

## 2024-06-11 DIAGNOSIS — O09.529 ADVANCED MATERNAL AGE IN MULTIGRAVIDA (HCC): Primary | ICD-10-CM

## 2024-06-11 PROCEDURE — 59025 FETAL NON-STRESS TEST: CPT

## 2024-06-11 PROCEDURE — 99212 OFFICE O/P EST SF 10 MIN: CPT | Performed by: ADVANCED PRACTICE MIDWIFE

## 2024-06-11 PROCEDURE — 99214 OFFICE O/P EST MOD 30 MIN: CPT

## 2024-06-11 NOTE — TRIAGE
[facility]  part of [facility]      Triage Note    Merle Bell Patient Status:  Outpatient    3/13/1985 MRN [id number]   Location [facility] Attending Sandie Potts CNM   Hosp Day # 0 PCP No primary care provider on file.      Para:   Estimated Date of Delivery: 24  Gestation: 37w1d    Chief Complaint    Leaking         Allergies:  No Known Allergies    Orders Placed This Encounter   Procedures    POCT Ferning       Lab Results   Component Value Date    WBC 8.8 2024    HGB 11.4 (L) 2024    HCT 32.5 (L) 2024    .0 2024    TSH 1.032 2024    RPR Non-Reactive 2023       Clinitek UA  Lab Results   Component Value Date    URINECUL Negative 2023       UA  No results found for: \"COLORUR\", \"CLARITY\", \"SPECGRAVITY\", \"PROUR\", \"GLUUR\", \"KETUR\", \"BILUR\", \"BLOODURINE\", \"NITRITE\", \"UROBILINOGEN\", \"LEUUR\", \"UASA\"    There were no vitals filed for this visit.    NST  Variability: Moderate           Accelerations: Yes           Decelerations: None            Baseline: 140 BPM           Uterine Irritability: No           Contractions: Irregular                                        Acoustic Stimulator: No           Nonstress Test Interpretation: Reactive           Nonstress Test Second Interpretation: Reactive                        Additional Comments       Chief Complaint   Patient presents with    Leaking     Pt sent from office visit to evaluate c/o leaking a few days ago and then again yesterday when she sneezed. Has not felt any leaking today     EFM tracing reactive with irregular contractions noted. + FM. Sterile speculum exam done. No fluid noted. Amniotest negative. Ferning not present. Sandie Potts CNM, informed of findings. Discharge order received. Kick count and labor precaution instructions reviewed with pt. Pt states understanding. Discharged to home.    Anjali KATZ RN  2024 4:23 PM

## 2024-06-11 NOTE — PROGRESS NOTES
Pt is a 39 year old female admitted to TR2/TR2-A.     Chief Complaint   Patient presents with    Leaking     Pt sent from office visit to evaluate c/o leaking a few days ago and then again yesterday when she sneezed. Has not felt any leaking today      Pt is  37w1d intra-uterine pregnancy.  History obtained, consents signed. Oriented to room, staff, and plan of care.

## 2024-06-17 ENCOUNTER — HOSPITAL ENCOUNTER (OUTPATIENT)
Facility: HOSPITAL | Age: 39
Discharge: HOME | End: 2024-06-17
Attending: ADVANCED PRACTICE MIDWIFE | Admitting: OBSTETRICS & GYNECOLOGY

## 2024-06-17 ENCOUNTER — HOSPITAL ENCOUNTER (OUTPATIENT)
Dept: PERINATAL CARE | Facility: HOSPITAL | Age: 39
End: 2024-06-17
Attending: ADVANCED PRACTICE MIDWIFE

## 2024-06-17 ENCOUNTER — ROUTINE PRENATAL (OUTPATIENT)
Dept: OBGYN CLINIC | Facility: CLINIC | Age: 39
End: 2024-06-17

## 2024-06-17 VITALS
HEART RATE: 80 BPM | BODY MASS INDEX: 32 KG/M2 | DIASTOLIC BLOOD PRESSURE: 82 MMHG | SYSTOLIC BLOOD PRESSURE: 126 MMHG | WEIGHT: 209 LBS

## 2024-06-17 VITALS — DIASTOLIC BLOOD PRESSURE: 64 MMHG | SYSTOLIC BLOOD PRESSURE: 105 MMHG

## 2024-06-17 DIAGNOSIS — Z34.93 PRENATAL CARE IN THIRD TRIMESTER (HCC): Primary | ICD-10-CM

## 2024-06-17 DIAGNOSIS — O09.529 ADVANCED MATERNAL AGE IN MULTIGRAVIDA (HCC): Primary | ICD-10-CM

## 2024-06-17 PROCEDURE — 59025 FETAL NON-STRESS TEST: CPT

## 2024-06-17 PROCEDURE — 59025 FETAL NON-STRESS TEST: CPT | Performed by: ADVANCED PRACTICE MIDWIFE

## 2024-06-17 NOTE — ADDENDUM NOTE
Encounter addended by: Vandana Donnelly RN on: 6/17/2024 3:17 PM   Actions taken: Flowsheet accepted

## 2024-06-17 NOTE — PROGRESS NOTES
Feeling well. Endorses regular fetal movement. Denies LOF, vaginal bleeding. Has been having raoul roland.    Plan:  BPP thursday  IOL Monday    Reviewed third trimester warning signs and labor signs and when to call.

## 2024-06-18 NOTE — PROGRESS NOTES
Pt is a 39 year old female admitted to TR3/TR3-A.     Chief Complaint   Patient presents with    R/o Labor     +CTX every few minutes since 1500; denies LOF; +light bleeding (got membrane sweep today); +FM      Pt is  38w0d intra-uterine pregnancy.  History obtained, consents signed. Oriented to room, staff, and plan of care.

## 2024-06-18 NOTE — PROGRESS NOTES
RN at bedside attempting to adjust toco to monitor contractions. Pt states feels contractions every few minutes and became argumentative and yelling at this RN. RN attempted to educate patient on monitoring, cervical exams, labor status and patient states you can \"educate him\". Declines education and states \"get these monitors off and leave the room to give me privacy, I'm going home\". This RN phoned Abi upon leaving the room and Abi educated patient via telephone.

## 2024-06-18 NOTE — TRIAGE
[facility]  part of [facility]      Triage Note    Merle Bell Patient Status:  Outpatient    3/13/1985 MRN [id number]   Location [facility] Attending Abi Cisneros CNM   Hosp Day # 0 PCP No primary care provider on file.      Para:   Estimated Date of Delivery: 24  Gestation: 38w0d    Chief Complaint    R/o Labor         Allergies:  No Known Allergies    No orders of the defined types were placed in this encounter.      Lab Results   Component Value Date    WBC 8.8 2024    HGB 11.4 (L) 2024    HCT 32.5 (L) 2024    .0 2024    TSH 1.032 2024    RPR Non-Reactive 2023       Clinitek UA  Lab Results   Component Value Date    URINECUL Negative 2023       UA  No results found for: \"COLORUR\", \"CLARITY\", \"SPECGRAVITY\", \"PROUR\", \"GLUUR\", \"KETUR\", \"BILUR\", \"BLOODURINE\", \"NITRITE\", \"UROBILINOGEN\", \"LEUUR\", \"UASA\"    There were no vitals filed for this visit.    NST  Variability: Moderate           Accelerations: Yes           Decelerations: None            Baseline: 130 BPM           Uterine Irritability: No                                Contraction Frequency: unable to trace on monitor, pt states every few minutes                   Acoustic Stimulator: No           Nonstress Test Interpretation: Reactive           Nonstress Test Second Interpretation: Reactive                        Additional Comments       Chief Complaint   Patient presents with    R/o Labor     +CTX every few minutes since 1500; denies LOF; +light bleeding (got membrane sweep today); +FM     Pt presented to unit for contractions, pt became upset and argumentative. Pt refused further evaluation including fetal monitoring and cervical exam. Pt stated she is going home and Abi Cisneros CNM on phone to speak with patient. This RN unable to give discharge paperwork and education as she ambulated off unit after speaking with Blayne MILNER  accompanied by her significant other.      Maricruz GARVEY, RN  6/17/2024 9:36 PM    Physician Evaluation      NST Interpretation: Reactive    Disposition:   Discharged    Comments:    Pt had paged around 1900 to report painful contractions. Advised patient to go to Triage for evaluation. She presented to Triage but became angry with care plan. Did not consent to cervical exam. I offered to perform the exam as I was at the hospital but she declined. Stated that she wanted to leave. I advised it best to know if she had made cervical change from office visit that day when she was 4-5 cms but she declined. Pt chose to leave without complete labor evaluation.    Abi Cisneros CNM

## 2024-06-19 PROBLEM — O09.893: Status: ACTIVE | Noted: 2024-06-19

## 2024-06-19 NOTE — PROGRESS NOTES
Outpatient Maternal-Fetal Medicine Consultation    Dear Ms. Cisneros    Thank you for requesting ultrasound evaluation and maternal fetal medicine consultation on your patient Merle Bell.  As you are aware she is a 39 year old female  with a singletonpregnancy and an Estimated Date of Delivery: 24.  A maternal-fetal medicine f/u is today.  Her prenatal records and labs were reviewed.    HISTORY  # 1 - Date: 11, Sex: Male, Weight: 8 lb (3.629 kg), GA: 39w0d, Type: Vaginal, Spontaneous, Apgar1: None, Apgar5: None, Living: Living, Birth Comments: None    # 2 - Date: 12, Sex: Male, Weight: 8 lb 3 oz (3.714 kg), GA: 38w0d, Type: Vaginal, Spontaneous, Apgar1: None, Apgar5: None, Living: Living, Birth Comments: None    # 3 - Date: , Sex: None, Weight: None, GA: 5w0d, Type: None, Apgar1: None, Apgar5: None, Living: None, Birth Comments: None    # 4 - Date: None, Sex: None, Weight: None, GA: None, Type: None, Apgar1: None, Apgar5: None, Living: None, Birth Comments: None     risk factors:  AMA    S:  Good FM.  No s/sx preeclampsia    She reports that she is 4 cm dilated and group B strep positive.  She was asking about confirming her induction which she believes is scheduled for .  Advised that she can stop at my  and see if they can see the L&D schedule otherwise she can call labor and delivery to confirm what time she is supposed to arrive on Monday.    We reviewed signs and symptoms of labor and reasons for come to labor and delivery.  We also reviewed group B strep that we have a goal of trying to get the full course of antibiotics on board prior to membrane rupture.    PHYSICAL EXAMINATION:  /89   Pulse 75   Wt 209 lb (94.8 kg)   LMP  (Approximate)   BMI 31.78 kg/m²   General: alert and oriented,no acute distress  Abdomen: gravid, soft, non-tender      OBSTETRIC ULTRASOUND  The patient had a limited and BPP ultrasound today which revealed size  consistent with dates.    Ultrasound Findings:  Single IUP in cephalic presentation.    Placenta is posterior.   A 3 vessel cord is noted.  Cardiac activity is present at 130 bpm  MVP is 5.6 cm . EMILY 20 cm  BPP is 8/8.       See PACS/Imaging Tab For Complete Ultrasound Report  I interpreted the results and reviewed them with the patient.    DISCUSSION  During her visit we discussed and reviewed the following issues:  ADVANCED MATERNAL AGE  I reviewed with the patient that pregnancies in women of advanced maternal age (35 or older at delivery) are associated with elevated risks.  Specifically, there is a higher rate of:    Fetal malformations  Preeclampsia  Gestational diabetes  Intrauterine fetal death   Please see previous MFM detailed discussion.     We reviewed the signs and symptoms of preeclampsia, labor and monitoring fetal movement.  We discussed reasons for her to call her physician.    Merle had her questions answered to her satisfaction    IMPRESSION:  IUP at 38w2d   AMA: low-risk cell free fetal DNA, declined invasive testing  BPP 8/8    RECOMMENDATIONS:  Continue care with Ms. Cisneros  Twice weekly testing at 38 weeks - weekly NST and weekly BPP.  Delivery at 39-40 weeks. -She reports her induction of labor is scheduled for Monday, June 24.    Thank you for allowing me to participate in the care of your patient.  Please do not hesitate to contact me if additional questions or concerns arise.      Radha Lawrence MD     20  minutes spent in review of records, patient consultation, documentation and coordination of care.  The relevant clinical matter(s) are summarized above.     Note to patient and family  The 21st Century Cures Act makes medical notes available to patients in the interest of transparency.  However, please be advised that this is a medical document.  It is intended as [id number]-hsui communication.  It is written and medical language may contain abbreviations or verbiage that are technical and  unfamiliar.  It may appear blunt or direct.  Medical documents are intended to carry relevant information, facts as evident, and the clinical opinion of the practitioner.

## 2024-06-20 ENCOUNTER — HOSPITAL ENCOUNTER (OUTPATIENT)
Dept: PERINATAL CARE | Facility: HOSPITAL | Age: 39
Discharge: HOME | End: 2024-06-20
Attending: OBSTETRICS & GYNECOLOGY

## 2024-06-20 VITALS
SYSTOLIC BLOOD PRESSURE: 131 MMHG | BODY MASS INDEX: 32 KG/M2 | WEIGHT: 209 LBS | DIASTOLIC BLOOD PRESSURE: 89 MMHG | HEART RATE: 75 BPM

## 2024-06-20 DIAGNOSIS — O09.523 MULTIGRAVIDA OF ADVANCED MATERNAL AGE IN THIRD TRIMESTER (HCC): ICD-10-CM

## 2024-06-20 DIAGNOSIS — O09.893: Primary | ICD-10-CM

## 2024-06-20 DIAGNOSIS — O09.893: ICD-10-CM

## 2024-06-20 PROCEDURE — 76815 OB US LIMITED FETUS(S): CPT

## 2024-06-20 PROCEDURE — 76819 FETAL BIOPHYS PROFIL W/O NST: CPT | Performed by: OBSTETRICS & GYNECOLOGY

## 2024-06-21 ENCOUNTER — PATIENT MESSAGE (OUTPATIENT)
Dept: OBGYN CLINIC | Facility: CLINIC | Age: 39
End: 2024-06-21

## 2024-06-21 NOTE — TELEPHONE ENCOUNTER
Informed pt that her Monday appointment will be canceled since she is being induced at 9 am on that day. Went over pitocin induction with pt and answered all questions. Pt did want to know if possibly her bag of water could be ruptured first, before starting pitocin, to see if her body could naturally go into labor. Advised pt that if baby is well engaged in her pelvis and FHT is reactive, could be done, but it is something she needs to  discuss with her delivery nurse and provider on Monday. Pt voices understanding.

## 2024-06-21 NOTE — TELEPHONE ENCOUNTER
From: Merle Bell  To: Sandie Delroy  Sent: 6/21/2024 9:59 AM CDT  Subject: Induction     Hello, I have an appt scheduled with you in the afternoon on Monday the e 24th but I am supposed to be scheduled at 9am for an induction. am i supposed to cancel this appt with you then? I haven’t received any information regarding the induction as well so a little anxious about it.     Thank you.

## 2024-06-22 ENCOUNTER — TELEPHONE (OUTPATIENT)
Dept: OBGYN UNIT | Facility: HOSPITAL | Age: 39
End: 2024-06-22

## 2024-06-24 ENCOUNTER — HOSPITAL ENCOUNTER (INPATIENT)
Facility: HOSPITAL | Age: 39
LOS: 1 days | Discharge: HOME | End: 2024-06-25
Attending: ADVANCED PRACTICE MIDWIFE | Admitting: OBSTETRICS & GYNECOLOGY

## 2024-06-24 ENCOUNTER — APPOINTMENT (OUTPATIENT)
Dept: OBGYN CLINIC | Facility: HOSPITAL | Age: 39
End: 2024-06-24

## 2024-06-24 PROBLEM — Z34.90 ENCOUNTER FOR INDUCTION OF LABOR (HCC): Status: ACTIVE | Noted: 2024-06-24

## 2024-06-24 PROBLEM — Z34.90 PREGNANCY (HCC): Status: ACTIVE | Noted: 2024-06-24

## 2024-06-24 LAB
ANTIBODY SCREEN: NEGATIVE
BASOPHILS # BLD AUTO: 0.04 X10(3) UL (ref 0–0.2)
BASOPHILS NFR BLD AUTO: 0.5 %
DEPRECATED RDW RBC AUTO: 44.1 FL (ref 35.1–46.3)
EOSINOPHIL # BLD AUTO: 0.14 X10(3) UL (ref 0–0.7)
EOSINOPHIL NFR BLD AUTO: 1.7 %
ERYTHROCYTE [DISTWIDTH] IN BLOOD BY AUTOMATED COUNT: 13.5 % (ref 11–15)
HCT VFR BLD AUTO: 31.4 %
HGB BLD-MCNC: 11.1 G/DL
IMM GRANULOCYTES # BLD AUTO: 0.06 X10(3) UL (ref 0–1)
IMM GRANULOCYTES NFR BLD: 0.7 %
LYMPHOCYTES # BLD AUTO: 2.09 X10(3) UL (ref 1–4)
LYMPHOCYTES NFR BLD AUTO: 24.7 %
MCH RBC QN AUTO: 31.7 PG (ref 26–34)
MCHC RBC AUTO-ENTMCNC: 35.4 G/DL (ref 31–37)
MCV RBC AUTO: 89.7 FL
MONOCYTES # BLD AUTO: 0.75 X10(3) UL (ref 0.1–1)
MONOCYTES NFR BLD AUTO: 8.9 %
NEUTROPHILS # BLD AUTO: 5.39 X10 (3) UL (ref 1.5–7.7)
NEUTROPHILS # BLD AUTO: 5.39 X10(3) UL (ref 1.5–7.7)
NEUTROPHILS NFR BLD AUTO: 63.5 %
PLATELET # BLD AUTO: 207 10(3)UL (ref 150–450)
RBC # BLD AUTO: 3.5 X10(6)UL
RH BLOOD TYPE: POSITIVE
RH BLOOD TYPE: POSITIVE
T PALLIDUM AB SER QL IA: NONREACTIVE
WBC # BLD AUTO: 8.5 X10(3) UL (ref 4–11)

## 2024-06-24 PROCEDURE — 59409 OBSTETRICAL CARE: CPT | Performed by: ADVANCED PRACTICE MIDWIFE

## 2024-06-24 PROCEDURE — 3E033VJ INTRODUCTION OF OTHER HORMONE INTO PERIPHERAL VEIN, PERCUTANEOUS APPROACH: ICD-10-PCS | Performed by: ADVANCED PRACTICE MIDWIFE

## 2024-06-24 PROCEDURE — 0HQ9XZZ REPAIR PERINEUM SKIN, EXTERNAL APPROACH: ICD-10-PCS | Performed by: ADVANCED PRACTICE MIDWIFE

## 2024-06-24 RX ORDER — BENZOCAINE/MENTHOL 6 MG-10 MG
1 LOZENGE MUCOUS MEMBRANE EVERY 6 HOURS PRN
Status: DISCONTINUED | OUTPATIENT
Start: 2024-06-24 | End: 2024-06-25

## 2024-06-24 RX ORDER — LIDOCAINE HYDROCHLORIDE 10 MG/ML
30 INJECTION, SOLUTION EPIDURAL; INFILTRATION; INTRACAUDAL; PERINEURAL ONCE
Status: COMPLETED | OUTPATIENT
Start: 2024-06-24 | End: 2024-06-24

## 2024-06-24 RX ORDER — AMMONIA INHALANTS 0.04 G/.3ML
0.3 INHALANT RESPIRATORY (INHALATION) AS NEEDED
Status: DISCONTINUED | OUTPATIENT
Start: 2024-06-24 | End: 2024-06-25

## 2024-06-24 RX ORDER — CITRIC ACID/SODIUM CITRATE 334-500MG
30 SOLUTION, ORAL ORAL AS NEEDED
Status: DISCONTINUED | OUTPATIENT
Start: 2024-06-24 | End: 2024-06-24 | Stop reason: HOSPADM

## 2024-06-24 RX ORDER — ONDANSETRON 2 MG/ML
4 INJECTION INTRAMUSCULAR; INTRAVENOUS EVERY 6 HOURS PRN
Status: DISCONTINUED | OUTPATIENT
Start: 2024-06-24 | End: 2024-06-25

## 2024-06-24 RX ORDER — ACETAMINOPHEN 500 MG
1000 TABLET ORAL EVERY 6 HOURS PRN
Status: DISCONTINUED | OUTPATIENT
Start: 2024-06-24 | End: 2024-06-24 | Stop reason: HOSPADM

## 2024-06-24 RX ORDER — DEXTROSE, SODIUM CHLORIDE, SODIUM LACTATE, POTASSIUM CHLORIDE, AND CALCIUM CHLORIDE 5; .6; .31; .03; .02 G/100ML; G/100ML; G/100ML; G/100ML; G/100ML
INJECTION, SOLUTION INTRAVENOUS CONTINUOUS
Status: DISCONTINUED | OUTPATIENT
Start: 2024-06-24 | End: 2024-06-24 | Stop reason: HOSPADM

## 2024-06-24 RX ORDER — TERBUTALINE SULFATE 1 MG/ML
0.25 INJECTION, SOLUTION SUBCUTANEOUS AS NEEDED
Status: DISCONTINUED | OUTPATIENT
Start: 2024-06-24 | End: 2024-06-24 | Stop reason: HOSPADM

## 2024-06-24 RX ORDER — IBUPROFEN 600 MG/1
600 TABLET ORAL ONCE AS NEEDED
Status: COMPLETED | OUTPATIENT
Start: 2024-06-24 | End: 2024-06-24

## 2024-06-24 RX ORDER — DOCUSATE SODIUM 100 MG/1
100 CAPSULE, LIQUID FILLED ORAL
Status: DISCONTINUED | OUTPATIENT
Start: 2024-06-24 | End: 2024-06-25

## 2024-06-24 RX ORDER — ACETAMINOPHEN 500 MG
1000 TABLET ORAL EVERY 6 HOURS PRN
Status: DISCONTINUED | OUTPATIENT
Start: 2024-06-24 | End: 2024-06-25

## 2024-06-24 RX ORDER — IBUPROFEN 600 MG/1
600 TABLET ORAL EVERY 6 HOURS
Status: DISCONTINUED | OUTPATIENT
Start: 2024-06-24 | End: 2024-06-25

## 2024-06-24 RX ORDER — ONDANSETRON 2 MG/ML
4 INJECTION INTRAMUSCULAR; INTRAVENOUS EVERY 6 HOURS PRN
Status: DISCONTINUED | OUTPATIENT
Start: 2024-06-24 | End: 2024-06-24 | Stop reason: HOSPADM

## 2024-06-24 RX ORDER — SODIUM CHLORIDE, SODIUM LACTATE, POTASSIUM CHLORIDE, CALCIUM CHLORIDE 600; 310; 30; 20 MG/100ML; MG/100ML; MG/100ML; MG/100ML
INJECTION, SOLUTION INTRAVENOUS AS NEEDED
Status: DISCONTINUED | OUTPATIENT
Start: 2024-06-24 | End: 2024-06-24 | Stop reason: HOSPADM

## 2024-06-24 RX ORDER — ACETAMINOPHEN 500 MG
500 TABLET ORAL EVERY 6 HOURS PRN
Status: DISCONTINUED | OUTPATIENT
Start: 2024-06-24 | End: 2024-06-24 | Stop reason: HOSPADM

## 2024-06-24 RX ORDER — BISACODYL 10 MG
10 SUPPOSITORY, RECTAL RECTAL ONCE AS NEEDED
Status: DISCONTINUED | OUTPATIENT
Start: 2024-06-24 | End: 2024-06-25

## 2024-06-24 RX ORDER — SIMETHICONE 80 MG
80 TABLET,CHEWABLE ORAL 3 TIMES DAILY PRN
Status: DISCONTINUED | OUTPATIENT
Start: 2024-06-24 | End: 2024-06-25

## 2024-06-24 RX ORDER — CHOLECALCIFEROL (VITAMIN D3) 25 MCG
1 TABLET,CHEWABLE ORAL DAILY
Status: CANCELLED | OUTPATIENT
Start: 2024-06-24

## 2024-06-24 RX ORDER — ACETAMINOPHEN 500 MG
500 TABLET ORAL EVERY 6 HOURS PRN
Status: DISCONTINUED | OUTPATIENT
Start: 2024-06-24 | End: 2024-06-25

## 2024-06-24 NOTE — H&P
[facility]  part of [facility]    History & Physical    Merle Bell Patient Status:  Outpatient    3/13/1985 MRN [id number]   Location [facility] Attending Zuly Clark CNM   Hosp Day # 0 Admitting Emily Pierce MD     Date of Admission:  2024      HPI:   Merle Bell is 39 year old and  with current gestational age of 39w0d and estimated due date of 2024, by Ultrasound consistent with lmp    Merle is being admitted for induction of labor.    Her current obstetrical history is significant for GBS colonizer, advanced maternal age, depression and anxiety  Patient reports no complaints .     Fetal Movement: normal.     History   Obstetric History:   OB History    Para Term  AB Living   4 2 2 0 1 2   SAB IAB Ectopic Multiple Live Births   1 0 0 0 2      # Outcome Date GA Lbr Stuart/2nd Weight Sex Type Anes PTL Lv   4 Current            3 SAB  5w0d          2 Term 12 38w0d  8 lb 3 oz (3.714 kg) M Vag-Spont None  ADAL      Complications: Group B beta strep +   1 Term 11 39w0d  8 lb (3.629 kg) M Vag-Spont EPI  ADAL      Complications: Group B beta strep +     Past Medical History: No past medical history on file.  Past Social History: No past surgical history on file.  Family History:   Family History   Problem Relation Age of Onset    Diabetes Father      Social History:   Social History     Tobacco Use    Smoking status: Never    Smokeless tobacco: Never   Substance Use Topics    Alcohol use: Never        Allergies/Medications:   Allergies:   No Known Allergies  Medications:  Medications Prior to Admission   Medication Sig Dispense Refill Last Dose    Prenatal Vit-Fe Fumarate-FA (MULTI PRENATAL) 27-0.8 MG Oral Tab Take by mouth.   2024       Review of Systems:   Constitutional: denies fever, aches, chills  HEENT: denies visual changes  Skin: denies any unusual skin lesions or itching  Lungs: denies shortness  of breath  Cardiovascular: denies chest pain  GI: denies abdominal pain,denies heartburn, denies constipation or diarrhea  : denies dysuria, pelvic pain, vaginal discharge or bleeding  Musculoskeletal: denies back or joint pain  Neuro denies headaches or dizziness  Psych: denies depression or anxiety    Physical Exam:   Temp:  [98.6 °F (37 °C)] 98.6 °F (37 °C)  Pulse:  [65-79] 65  Resp:  [14-16] 16  BP: (124-129)/(84-85) 129/85  SpO2:  [96 %-99 %] 97 %    Constitutional: alert, appears stated age, and cooperative  Respiratory: clear, unlabored  Cardiac: regular rate and rhythm   Abdomen: soft, non-tender, EFW 7.5  Fetal Surveillance:  130 BPM; Fetal heart variability: moderate  Fetal Heart Rate decelerations: none  Fetal Heart Rate accelerations: yes  Uterine contractions: irregular, every 3-5 minutes  Pelvis: Gynecoid  External Genitalia:  No lesions  Sterile vaginal exam: deferred  Extremities: extremities normal, atraumatic, no cyanosis or edema  Musculoskeletal: steady gait  Psych:  Appropriate affect and speech    Results:     Prenatal Results       Initial       Test Value Reference Range Date Time    Blood Type (ABO Group)  A   06/24/24 0945    Rh Factor  Positive   06/24/24 0945    Antibody Screen (Required questions in OE to answer) ^ Negative   12/20/23     HCT ^ 33.0   12/20/23     HGB ^ 11.5   12/20/23     MCV ^ 93   12/20/23     Platelets ^ 293   12/20/23     Rubella ^ Immune   12/20/23     TREP        RPR ([facility]) ^ Non-Reactive   12/20/23     Urine Culture ^ Negative   12/20/23     Hepatitis B ^ Non-Reactive   12/20/23     HIV Combo ^ Negative   12/20/23     HCV  Nonreactive  Nonreactive  02/13/24 1209              Optional Initial Labs       Test Value Reference Range Date Time    TSH  1.032 mIU/mL 0.550 - 4.780 02/13/24 1209    Pap Smear ^ NILM   12/20/23     HPV ^ Negative   12/20/23     GC DNA        Chlamydia DNA        GTT 1 Hr        Glucose Fasting        Glucose 1 Hr        Glucose 2 Hr         Glucose 3 Hr        HgB A1c  4.8 % <5.7 24 1209    Vitamin D                  8-20 Weeks       Test Value Reference Range Date Time    1st Trimester Aneuploidy Risk Assessment        Quad - Down Screen Risk Estimate - prior to 18        Quad - Down Maternal Age Risk - prior to 18        Quad - Trisomy 18 screen Risk Estimate - prior to 18        AFP Spina Bifida (Required questions in OE to answer )        QUAD/AFP - Interpretation        Free Fetal DNA         Genetic testing ^ Negative   23     Genetic testing        Genetic testing        GC DNA        Chlamydia DNA                  24-28 Weeks       Test Value Reference Range Date Time    HCT  32.5 % 35.0 - 48.0 24 1138    HGB  11.4 g/dL 12.0 - 16.0 24 1138    Platelets  254.0 10(3)uL 150.0 - 450.0 24 1138    GTT 1 Hr  94 mg/dL See comment 24 1138    Glucose Fasting        Glucose 1 Hr        Glucose 2 Hr        Glucose 3 Hr        TSH         Profile        Urine Culture        GC DNA        Chlamydia DNA                  35-37 Weeks       Test Value Reference Range Date Time    HCT  31.4 % 35.0 - 48.0 24 0927    HGB  11.1 g/dL 12.0 - 16.0 24 0927    Platelets  207.0 10(3)uL 150.0 - 450.0 24 0927    TREP  Nonreactive  Nonreactive  24 0927       Nonreactive  Nonreactive  24 1138    RPR ([facility])        Genital Group B Culture  Positive  Negative 24 1458    TSH        Urine Culture        HIV Combo        GC DNA        Chlamydia DNA                  Optional Labs       Test Value Reference Range Date Time    HgB A1c  4.8 % <5.7 24 1209    HGB Electrophoresis ^ Normal Adult Pattern   23     Varicella Zoster  855.70  >165.00 24 1209    Cystic Fibrosis-Old         Cystic Fibrosis[32] (Required questions in OE to answer)        Cystic Fibrosis[165] (Required questions in OE to answer)        Cystic Fibrosis[165] (Required questions in OE to answer)         Cystic Fibrosis[165] (Required questions in OE to answer)        Sickle Cell        24Hr Urine Protein        24Hr Urine Creatinine        Parvo B19 IgM        Parvo B19 IgG                  Legend    ^: Historical                            Reviewed all prenatal ultrasounds    Admit labs pending    Assessment/Plan:   Merle is 39 year old and  with current gestational age of 39w0d and estimated due date of 2024, by Ultrasound consistent with lmp    Early latent labor.  Category 1 FHR tracing  Obstetrical history significant for GBS colonizer, advanced maternal age, depression and anxiety .    Admission problem(s):    Encounter for induction of labor (HCC)  Pitocin protocol 2      Advanced maternal age in multigravida ([facility])        Depression & Anxiety   Has a therapist        Group B streptococcal infection during pregnancy ([facility])  Abx       Pregnancy ([facility])            Risks, benefits, alternatives and possible complications have been discussed in detail with the patient.   Pre-admission, admission, and post admission procedures and expectations were discussed in detail.    All questions answered, all appropriate consents will be signed at the Hospital. Admission is planned for today.   Ambulate, monitored. and Intervention: IV Pitocin induction.    Zuly Clark CNM  2024  4:11 PM

## 2024-06-24 NOTE — PROGRESS NOTES
[facility]  part of [facility]      Labor Progress Note    Merle Bell Patient Status:  Outpatient    3/13/1985 MRN [id number]   Location [facility] Attending Zuly Clark CNM   Hosp Day # 0 PCP No primary care provider on file.     Subjective:   Interval History: .    Feeling pressure  Would like medication for pain management    Objective:   Vital signs in last 24 hours:  Temp:  [98.6 °F (37 °C)] 98.6 °F (37 °C)  Pulse:  [65-79] 65  Resp:  [14-16] 16  BP: (124-129)/(84-85) 129/85  SpO2:  [96 %-99 %] 97 %    Fetal Surveillance:  Fetal heart variability: moderate  Fetal Heart Rate decelerations: variable and occasional and mild  Fetal Heart Rate accelerations: yes  Baseline FHR: 130 per minute  Uterine contractions: regular, every 2-4 minutes    Cervix:  Dilation:7 cm dilation   Effacement: 90  Station:-1   Consistency:medium  Position: Cephalic  Presentation: vertex    Assessment & Plan:   Encounter for induction of labor ([facility])  Pitocin off  IV pain medication as requested       Advanced maternal age in multigravida ([facility])          Depression & Anxiety                Has a therapist          Group B streptococcal infection during pregnancy ([facility])  Abx 2 doses received      Zuly Clark CNM  2024  4:16 PM

## 2024-06-24 NOTE — L&D DELIVERY NOTE
[facility]  part of [facility]    Vaginal Delivery Note    Merle PALOMOonnell Patient Status:  Outpatient    3/13/1985 MRN [id number]   Location [facility] Attending Zuly Clark CNM   Hosp Day # 0 PCP No primary care provider on file.     Delivery     Infant  Date of Delivery: 2024    Time of Delivery: 4:58 PM   Delivery Type: Normal spontaneous vaginal delivery     Infant Sex/Birthweight: male 8 lb 0.8 oz (3.65 kg)     Presentation Vertex [1]   Position   Occiput [1]  Anterior [1]     Apgars:  1 minute: 9                5 minutes: 9                         10 minutes:      Placenta  Date/Time of Delivery: 2024  5:02 PM    Delivery: spontaneous  Placenta to Pathology: no  Cord Gases Submitted: no  Cord Blood Collection: yes  Cord Tissue Collection: yes  Cord Complications: single nuchal  Sponge and Needle Counts:  Verified yes    Maternal Anesthesia: IV sedation   Episiotomy/Laceration Repair  Laceration: perineal 1 degree  Location: midline  Suture Size/Type: 3-0 Chromic  Anesthesia: 1% lidocaine  Repair Comments: Uncomplicated in the usual fashion    Delivery Complications  none    Neonatologist Present: no  Delivery Comment: Uncomplicated vaginal delivery  Mother & baby in stable condition    Intake/Output   EBL:  98ml      Zuly Clark CNM   2024  5:36 PM

## 2024-06-24 NOTE — PLAN OF CARE
Problem: BIRTH - VAGINAL/ SECTION  Goal: Fetal and maternal status remain reassuring during the birth process  Description: INTERVENTIONS:  - Monitor vital signs  - Monitor fetal heart rate  - Monitor uterine activity  - Monitor labor progression (vaginal delivery)  - DVT prophylaxis (C/S delivery)  - Surgical antibiotic prophylaxis (C/S delivery)  Outcome: Progressing     Problem: PAIN - ADULT  Goal: Verbalizes/displays adequate comfort level or patient's stated pain goal  Description: INTERVENTIONS:  - Encourage pt to monitor pain and request assistance  - Assess pain using appropriate pain scale  - Administer analgesics based on type and severity of pain and evaluate response  - Implement non-pharmacological measures as appropriate and evaluate response  - Consider cultural and social influences on pain and pain management  - Manage/alleviate anxiety  - Utilize distraction and/or relaxation techniques  - Monitor for opioid side effects  - Notify MD/LIP if interventions unsuccessful or patient reports new pain  - Anticipate increased pain with activity and pre-medicate as appropriate  Outcome: Progressing     Problem: ANXIETY  Goal: Will report anxiety at manageable levels  Description: INTERVENTIONS:  - Administer medication as ordered  - Teach and rehearse alternative coping skills  - Provide emotional support with 1:1 interaction with staff  Outcome: Progressing     Problem: Patient Centered Care  Goal: Patient preferences are identified and integrated in the patient's plan of care  Description: Interventions:  - What would you like us to know as we care for you? Baby number 3  - Provide timely, complete, and accurate information to patient/family  - Incorporate patient and family knowledge, values, beliefs, and cultural backgrounds into the planning and delivery of care  - Encourage patient/family to participate in care and decision-making at the level they choose  - Honor patient and family  perspectives and choices  Outcome: Progressing     Problem: Patient/Family Goals  Goal: Patient/Family Long Term Goal  Description: Patient's Long Term Goal: uncomplicated vaginal delivery    Interventions:  - See additional Care Plan goals for specific interventions  Outcome: Progressing  Goal: Patient/Family Short Term Goal  Description: Patient's Short Term Goal: pain control    Interventions:  -Non Pharmacological pain interventions  -IV/IM and epidural pain medication per physician order and patient's request  -Education  -Involve patient in POC     - See additional Care Plan goals for specific interventions  Outcome: Progressing

## 2024-06-24 NOTE — PROGRESS NOTES
Pt is a 39 year old female admitted to LDR8/LDR8-A.     Chief Complaint   Patient presents with    Scheduled Induction     ama      Pt is  39w0d intra-uterine pregnancy.  History obtained, consents signed. Oriented to room, staff, and plan of care.

## 2024-06-24 NOTE — PROGRESS NOTES
Provide patient with handout, \"Nitrous Oxide for use in Labor and Immediately postpartum.\" Instruct patient on self administration including  placement of mask, and timing of breathing for maximum analgesic effect.  Support person educated that they absolutely cannot assist in the delivery of nitrous oxide.  Support person educated against using the nitrous for personal consumption.     Nitrous Oxide self-administration education provided by: Olivia HAWKINS  Provider instructed patient and support person on the use of nitrous oxide use for pain management and self-administration policy  Consent obtained  Equipment set up done by Olivia HAWKINS.  Baseline BP, heart rate, respiratory rate and pulse ox obtained  Pain assessment completed  Time self-administered nitrous oxide for pain management initiated: 7105   RN observed administration and use of nitrous oxide for the first 15 minutes of use

## 2024-06-25 VITALS
HEIGHT: 68 IN | TEMPERATURE: 98 F | WEIGHT: 209 LBS | OXYGEN SATURATION: 97 % | BODY MASS INDEX: 31.67 KG/M2 | DIASTOLIC BLOOD PRESSURE: 83 MMHG | RESPIRATION RATE: 16 BRPM | HEART RATE: 64 BPM | SYSTOLIC BLOOD PRESSURE: 121 MMHG

## 2024-06-25 LAB
BASOPHILS # BLD AUTO: 0.05 X10(3) UL (ref 0–0.2)
BASOPHILS NFR BLD AUTO: 0.4 %
DEPRECATED RDW RBC AUTO: 47.1 FL (ref 35.1–46.3)
EOSINOPHIL # BLD AUTO: 0.19 X10(3) UL (ref 0–0.7)
EOSINOPHIL NFR BLD AUTO: 1.6 %
ERYTHROCYTE [DISTWIDTH] IN BLOOD BY AUTOMATED COUNT: 13.5 % (ref 11–15)
HCT VFR BLD AUTO: 31.3 %
HGB BLD-MCNC: 10.4 G/DL
IMM GRANULOCYTES # BLD AUTO: 0.06 X10(3) UL (ref 0–1)
IMM GRANULOCYTES NFR BLD: 0.5 %
LYMPHOCYTES # BLD AUTO: 2.89 X10(3) UL (ref 1–4)
LYMPHOCYTES NFR BLD AUTO: 24.5 %
MCH RBC QN AUTO: 31.7 PG (ref 26–34)
MCHC RBC AUTO-ENTMCNC: 33.2 G/DL (ref 31–37)
MCV RBC AUTO: 95.4 FL
MONOCYTES # BLD AUTO: 1.15 X10(3) UL (ref 0.1–1)
MONOCYTES NFR BLD AUTO: 9.7 %
NEUTROPHILS # BLD AUTO: 7.47 X10 (3) UL (ref 1.5–7.7)
NEUTROPHILS # BLD AUTO: 7.47 X10(3) UL (ref 1.5–7.7)
NEUTROPHILS NFR BLD AUTO: 63.3 %
PLATELET # BLD AUTO: 204 10(3)UL (ref 150–450)
RBC # BLD AUTO: 3.28 X10(6)UL
WBC # BLD AUTO: 11.8 X10(3) UL (ref 4–11)

## 2024-06-25 RX ORDER — FERROUS SULFATE 325(65) MG
325 TABLET, DELAYED RELEASE (ENTERIC COATED) ORAL EVERY OTHER DAY
Qty: 30 TABLET | Refills: 0 | Status: SHIPPED | OUTPATIENT
Start: 2024-06-25

## 2024-06-25 RX ORDER — PSEUDOEPHEDRINE HCL 30 MG
100 TABLET ORAL 2 TIMES DAILY
Qty: 30 CAPSULE | Refills: 0 | Status: SHIPPED | OUTPATIENT
Start: 2024-06-25

## 2024-06-25 RX ORDER — IBUPROFEN 600 MG/1
600 TABLET ORAL EVERY 6 HOURS
Qty: 30 TABLET | Refills: 0 | Status: SHIPPED | OUTPATIENT
Start: 2024-06-25

## 2024-06-25 NOTE — DISCHARGE SUMMARY
[facility]  part of [facility]    Discharge Summary    Merle Bell Patient Status:  Inpatient    3/13/1985 MRN [id number]   Location [facility] 3SE Attending Zuly Clark CNM   Hosp Day # 1       Delivering OB Clinician: Zuly Clark CNM    EDC: Estimated Date of Delivery: 24    Gestational Age: 39w0d    Antepartum complications:   Patient Active Problem List   Diagnosis    Advanced maternal age in multigravida ([facility])    Anxiety    Depression during pregnancy in second trimester ([facility])    History of smoking    Group B streptococcal infection during pregnancy ([facility])    Pregnant ([facility])    Supervision of other high risk pregnancies, third trimester ([facility])    Pregnancy ([facility])    Encounter for induction of labor ([facility])     (normal spontaneous vaginal delivery) ([facility])       Date of Delivery: 2024  Time of Delivery: 4:58 PM     Delivery Type: spontaneous vaginal delivery    Baby: Liveborn male   Information for the patient's :  Andres Bell [[id number]]   8 lb 0.8 oz (3.65 kg)   Apgars:  1 minute: 9   5 minutes: 9 10 minutes:       Intrapartum Complications: None    Admit Date: 2024    Discharge Date: 2024    Hospital Course: No complications. Routine delivery and postpartum care    Discharged Condition: stable    Disposition: home    Plan:     Follow-up appointment in 2 weeks with ALF Vazquez CNM  2024  1:01 PM

## 2024-06-25 NOTE — PROGRESS NOTES
Transferred Mother to room   350    via wheelchair accompanied by S.O., in stable condition. Report given to   Aliya HAWKINS. Bed in locked and low position, side rails up x 2, ID's checked and verified, call light with in reach, reinforced pt to call for assistance when needs  to go to the bathroom.

## 2024-06-25 NOTE — PROGRESS NOTES
[facility]  part of [facility]    OB/GYNE Progress Note      Merle Bell Patient Status:  Inpatient    3/13/1985 MRN [id number]   Location [facility] 3SE Attending Zuly Clark CNM   Hosp Day # 1 PCP No primary care provider on file.        Assessment/Plan   Merle Bell is a 39 year old , day 1 after a vaginal delivery  Breastfeeding without difficulty   Discharge home anticipated this afternoon, pending baby discharge  Postpartum teaching completed including danger signs and when to call the CNM      Encounter for induction of labor ([facility])   Delivered      Advanced maternal age in multigravida ([facility])   Delivered      Anxiety   Stable with counseling      Group B streptococcal infection during pregnancy ([facility])   Delivered         (normal spontaneous vaginal delivery) ([facility])  Normal PPD#1        Discussed with: nurse     patient and spouse     Plan discussed with patient who verbalizes understanding and agreement.        Subjective   Currently she denies excessive bleeding or pain. No clots. Denies SOB, chest pain, HA, dizziness. + void. + stool  Support at home: partner  Has car seat   Undecided on birth control    Review of Systems:  Constitutional: Denies   Genitourinary: Denies   Respiratory: Denies   Psychiatric: Denies         Objective   Vital signs in last 24 hours:  Temp:  [97.6 °F (36.4 °C)-98.6 °F (37 °C)] 98 °F (36.7 °C)  Pulse:  [] 64  Resp:  [16] 16  BP: (116-134)/(48-84) 121/83  SpO2:  [96 %-99 %] 97 %    Input/Output:    Intake/Output Summary (Last 24 hours) at 2024 1257  Last data filed at 2024 2300  Gross per 24 hour   Intake --   Output 848 ml   Net -848 ml       Weight (Last 6):  Wt Readings from Last 6 Encounters:   24 209 lb (94.8 kg)   24 209 lb (94.8 kg)   24 209 lb (94.8 kg)   24 207 lb (93.9 kg)   24 205 lb (93 kg)   24 205 lb (93 kg)     Body mass index is 31.78 kg/m².      Exam:  Constitutional: Comfortable and bonding well with infant   Uterus: Fundus firm, below umbilicus   Wound/Incision: Well-approximated, no swelling or edema, small lochia rubra, no clots   Respiratory: Unlabored respirations   Extremities: No edema. No evidence of DVT on exam   Psychiatric: Calm affect, appropriate     DVT Risk Score: Low risk        Results:     Lab Results   Component Value Date    TREPONEMALAB Nonreactive 06/24/2024    ABO A 06/24/2024    RH Positive 06/24/2024    WBC 11.8 (H) 06/25/2024    HGB 10.4 (L) 06/25/2024    HCT 31.3 (L) 06/25/2024    .0 06/25/2024    TSH 1.032 02/13/2024       Lab Results   Component Value Date    RPR Non-Reactive 12/20/2023       No results found.          Dolores Nicole CNM  6/25/2024  12:57 PM

## 2024-06-25 NOTE — CM/SW NOTE
SW self referral due to finances/WIC resources    SW met with patient and FOB  bedside.  SW confirmed face sheet contact as correct.    Baby boy/girl name: Blessing Leon  Date & time of delivery:6/24/24 @ 4:58pm  Delivery method:Normal spontaneous vaginal delivery   Siblings age:13 and 11 yr old    Patient employed: Yes  Length of maternity leave:TBD    Father of baby employed:Yes  Length of paternity leave:2 weeks    Breast or formula feed:Breast feed    Pediatrician:ROED  ARNOLD encouraged pt to schedule infant first appointment (usually within 48 hours of discharge) prior to pt discharge. Pt expressed understanding.     Infant Insurance:Pt mother is enrolled in Medicaid, however pt father plans to add infant to his BCBS plan.  SW encourged him to add infant to his insurance within 30 days to insure coverage, FOB expressed understanding.  [facility] contacted:Yes    Mental Health History: Pt denies hx of depression and anxiety    Medications:n/a    Therapist:n/a    Psychiatrist:n/a    SW discussed signs, symptoms and risks associated with post partum depression & anxiety.  ARNOLD provided pt with PMAD resources.  Other resources provided: [place] and [facility] specific resources.    Patient support system:Pt endorses her family as her support system.    Patient denied current questions/needs from SW.    SW/CM to remain available for support and/or discharge planning.      Dolores PATTERSON, LSW  Social Work   Ext:#[phone]

## 2024-06-25 NOTE — DISCHARGE INSTRUCTIONS
Discharge Instructions for Vaginal Delivery  Follow-up  Schedule a  follow-up exam with the midwife who delivered you in 2 weeks and 6 weeks. Please remember to call as soon as possible for this appointment. After having a baby, your body may be very tired. It can take time to recover from a vaginal delivery. Please remember this and call if you have any questions or concerns before your 6 week appointment.   Medications    Please take Motrin at home for pain control 600mg every 6 hours as needed  Continue taking your Prenatal Vitamin daily, as long as you are nursing  Ferrous Sulfate 325 mg once per day (may obtain in form of Gentle Iron, Slow FE, or liquid Floradix)  Vitamin D3 2000 IU daily  Colace (stool softener)  once or twice per day as needed  If you have stitches  You may have received stitches in the skin near your vagina. The stitches might have closed an episiotomy (an incision that enlarges the opening of the vagina). Or you may have needed stitches to repair torn skin. Either way, your stitches should dissolve within weeks. Until then, you can help reduce discomfort, aid healing, and reduce your risk of infection by keeping the stitches clean. These tips can help:  Gently wipe from front to back after you urinate or have a bowel movement.  After wiping, spray warm water on the area. Or you can have a sitz bath. This means sitting in a tub with a few inches of water in it. Then pat the area dry or use a hairdryer on a cool setting.  You can take a shower unless told not to.  Change sanitary pads at least every 2 to 4 hours.  Place cold packs as need, but remember to keep a thin towel between the pack and your skin.  Activity  Here are some suggestions:  Get lots of rest. Take naps as often as your can.   Increase your activities gradually.   Don’t have sexual intercourse until after you’ve had a follow-up appointment and you have decided on a birth control method.  Remember your are on pelvic  rest, so nothing in your vagina (tampons etc...), no tub baths, and no swimming pools.       When to call your healthcare provider  Call your health care provider right away if you have:  A fever of 100.4°F (38.0°C) or higher  Bleeding that requires a new sanitary pad after an hour, or large blood clots. Remember your bleeding will wax and wane. Please call if you are consistently saturating a pad and hour.   Pain in your vagina that gets worse and isn't relieved with medicine.  Burning, pain, red streaks, or lumpy areas in your breasts that may be accompanied by flu-like symptoms  Nausea or vomiting  Dizziness or fainting  Feelings of extreme sadness or anxiety, or a feeling that you don’t want to be with your baby  Abdominal pain that isn’t relieved with medicine  No bowel movement for 5 days  Redness, warmth, or pain in the lower leg  Chest pain

## 2024-06-27 ENCOUNTER — PATIENT OUTREACH (OUTPATIENT)
Dept: CASE MANAGEMENT | Age: 39
End: 2024-06-27

## 2024-06-27 NOTE — PROGRESS NOTES
[facility] Post Partum apt request (delivered 06/24)    Zuly Clark CNM  [address]  [place] [address]  [phone]  2w virtual -  6w -   [facility] to call [phone] to assist w/scheduling apt

## 2024-06-28 PROBLEM — O09.523 AMA (ADVANCED MATERNAL AGE) MULTIGRAVIDA 35+, THIRD TRIMESTER (HCC): Status: ACTIVE | Noted: 2024-01-03

## 2024-06-28 NOTE — PROGRESS NOTES
[facility] Post Partum apt request (delivered 06/24)     Zuly Clark CNM  [address]  [facility] [address]  [phone]  2w virtual -  6w -   Multiple attempts w/no calls back; no apt made  Closing encounter

## 2024-06-29 ENCOUNTER — NST DOCUMENTATION (OUTPATIENT)
Dept: OBGYN CLINIC | Facility: CLINIC | Age: 39
End: 2024-06-29

## 2024-06-29 DIAGNOSIS — O09.523 AMA (ADVANCED MATERNAL AGE) MULTIGRAVIDA 35+, THIRD TRIMESTER (HCC): Primary | ICD-10-CM

## 2024-06-29 PROCEDURE — 59025 FETAL NON-STRESS TEST: CPT | Performed by: ADVANCED PRACTICE MIDWIFE

## 2024-06-30 NOTE — NST
Nonstress Test   Patient: Merle Bell    Gestation: 39w0d    Diagnosis from order:  ama    Problem List:   Patient Active Problem List   Diagnosis    AMA (advanced maternal age) multigravida 35+, third trimester ([facility])    Anxiety    Depression during pregnancy in second trimester ([facility])    History of smoking    Group B streptococcal infection during pregnancy ([facility])    Pregnant ([facility])    Supervision of other high risk pregnancies, third trimester ([facility])    Pregnancy ([facility])    Encounter for induction of labor ([facility])     (normal spontaneous vaginal delivery) ([facility])       NST:        2024   NST DOCUMENTATION   Variability 6-25 BPM    6-25 BPM   Accelerations Yes    Yes   Decelerations None    None   Baseline 130 BPM    130 BPM   Uterine Irritability No    No   Contractions Irregular   Contraction Frequency unable to trace on monitor, pt states every few minutes   Acoustic Stimulator No    No   Nonstress Test Interpretation Reactive    Reactive   Nonstress Test Second Interpretation Reactive    Reactive   FHR Category Category I   NST Completed by Puma HAWKINS   Disposition  appointment    Testing Plan Weekly NST   Provider Notified Eduardo awan       Multiple values from one day are sorted in reverse-chronological order         I agree with the above evaluation. NST completed.  Zuly Clark CNM  2024  8:27 PM

## 2024-07-09 ENCOUNTER — TELEMEDICINE (OUTPATIENT)
Dept: OBGYN CLINIC | Facility: CLINIC | Age: 39
End: 2024-07-09

## 2024-07-09 NOTE — PROGRESS NOTES
Subjective: Merle is 2 weeks postpartum after a vaginal delivery.  See L&D delivery summary for birth statistics.  She is without concerns today. Bleeding is decreasing and not experiencing any excessive pain.    Breastfeeding successfully and reports infant is experiencing adequate weight gain.    She denies any signs/symptoms of postpartum depression and has adequate family support.  Denies any abuse.    Contraceptive plan: condoms    Constitutional: negative; feels well  Cardiovascular: negative; denies chest pain or palpitations  Respiratory: negative; denies shortness of breath  Gastrointestinal: negative; denies heartburn, abdominal pain, diarrhea or constiptation  Genitourinary: negative; denies dysuria, incontinence, vaginal itching or discharge.    Musculoskeletal: negative; denies back pain.  Skin/Breast: negative; Denies any breast pain, lumps, or discharge.  Denies any skin lesions.  Neurological: negative; denies headaches, extremity weakness or numbness.  Psychiatric: negative; denies depression or anxiety.  Endocrine:  negative; denies any thyroid history; denies excessive thirst or urination.      Objective:   There were no vitals filed for this visit.  Wt Readings from Last 6 Encounters:   06/24/24 209 lb (94.8 kg)   06/20/24 209 lb (94.8 kg)   06/17/24 209 lb (94.8 kg)   06/11/24 207 lb (93.9 kg)   06/03/24 205 lb (93 kg)   05/28/24 205 lb (93 kg)       OBGyn Exam   Engaged and appears to bonding well with infant.      Assessment/Plan:   2 weeks postpartum, stable. Breast / Formula feeding without difficulty  Contraception: condoms  Return to clinic: 4 weeks    Counseling included:   Signs/symptoms of postpartum depression  Postpartum danger signs  Lactation support and troubleshooting  Maternal and family adaption  Sleep/rest strategies  Sexuality  Infant safety and care  Parenting concerns  Occupational concerns  Contraception    Merle verbalized understanding of plan of care. All questions answered.  No barriers to learning identified.

## 2024-07-24 ENCOUNTER — TELEPHONE (OUTPATIENT)
Dept: OBGYN UNIT | Facility: HOSPITAL | Age: 39
End: 2024-07-24

## 2024-08-06 ENCOUNTER — POSTPARTUM (OUTPATIENT)
Dept: OBGYN CLINIC | Facility: CLINIC | Age: 39
End: 2024-08-06

## 2024-08-06 VITALS
HEART RATE: 91 BPM | DIASTOLIC BLOOD PRESSURE: 87 MMHG | BODY MASS INDEX: 28.88 KG/M2 | HEIGHT: 67 IN | WEIGHT: 184 LBS | SYSTOLIC BLOOD PRESSURE: 128 MMHG

## 2024-08-06 DIAGNOSIS — N81.89 PELVIC FLOOR RELAXATION: ICD-10-CM

## 2024-08-06 NOTE — PROGRESS NOTES
Patient here for postpartum check-up. Vaginal delivery @ 6 weeks ago with CNM . Breastfeeding exclusively, on demand. Baby with adequate weight gain.   Denies fevers, chills, body aches and flu-like symptoms.  Denies abdominal pain, no pelvic pain, reports no vaginal bleeding. Bleeding stopped 1 week ago     Denies dysuria, urinary frequency and urinary incontinence.  Denies constipation, painful bowel movements and fecal incontinence.  Denies symptoms of postpartum depression including mood, affect, appetite / activity level changes. Has adequate support at home.     Perineum concerns: none  Depression / GADscreen: see flow  Considering condoms for BC method    O: /87   Pulse 91   Ht 5' 7\" (1.702 m)   LMP 09/30/2023 (Approximate)   Breastfeeding Yes   BMI 32.73 kg/m²   General: well groomed, Alert and oriented x 3    Neck: supple, no nodes, euthyroid  Lungs: normal effort  Breasts: bilaterally lactating, no masses, no skin redness, nipples intact with no trauma    Abdomen: non-tender, no masses, no hernia,   : perineum intact, introitus normal, vaginal walls pink, physiologic discharge; cervix intact no lesions,  uterus non-tender, normal size, no adnexal masses or tenderness; neg CMT  Psych: pleasant, normal affect  Kegel strength:  2 - Weak, no closure or lift but attempt       A: Normal PP involution      Breastfeeding   P: Continue to breastfeed exclusively, continue PNV while breastfeeding and for preconception.       Condoms for BC method  Follow-up with routine annual exam before the end of year    Merle was seen today for postpartum care.    Diagnoses and all orders for this visit:    Normal postpartum course (HCC)    Pelvic floor relaxation  -     Physical Therapy Referral - [facility]

## 2024-09-13 ENCOUNTER — OFFICE VISIT (OUTPATIENT)
Dept: PHYSICAL THERAPY | Age: 39
End: 2024-09-13
Attending: ADVANCED PRACTICE MIDWIFE
Payer: MEDICAID

## 2024-09-13 DIAGNOSIS — N81.89 PELVIC FLOOR RELAXATION: Primary | ICD-10-CM

## 2024-09-13 PROCEDURE — 97112 NEUROMUSCULAR REEDUCATION: CPT

## 2024-09-13 PROCEDURE — 97530 THERAPEUTIC ACTIVITIES: CPT

## 2024-09-13 PROCEDURE — 97162 PT EVAL MOD COMPLEX 30 MIN: CPT

## 2024-09-13 NOTE — PROGRESS NOTES
MUSCULOSKELETAL AND PELVIC FLOOR EVALUATION:     Diagnosis:     Pelvic floor relaxation (N81.89)   Urge incontinence    Referring Provider: Zuly Clark  Date of Evaluation:    9/13/2024    Precautions:  None Next MD visit:   none scheduled  Date of Surgery: n/a     PATIENT SUMMARY   Merle Bell is a 39 year old female  who presents to therapy today with complaints of urge incontinence, incomplete emptying of bladder, and leakage. She is a mother of 3 children ages 13, 12 and 3 mo old. Midwife recommended PT for post-partum care. She has no current c/o pain but had some B hip pain during pregnancy. She can get hemmrhoids - can be painful during BM (not bloody). She says she can work towards improving her nutrition. She eats out a lot and has a goal to start cooking at home. She currently is not eating any veggies. She is breast feeding.  Current symptoms include: urgency/frequency, incomplete emptying, and leakage    Pt describes pain level: current 0/10, best 0/10, worst 0/10.     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 No limitations with ADLs and IADLs. Pt goals include to improve bladder function and stop incontinence.  Past medical history was reviewed with Merle. Significant findings include depression during pregnancy;     URINARY HABITS  Types of symptoms: urge incontinence  Events associated with the onset of urinary complaints: no known reason  Abdominal/Vaginal Pressure complaints: no  Urinary Frequency: every 1 hour  Urine Stream: normal  Amount: about 2 cups  Leaking occurs: sneezing, laughing, exercise (jumping); gym 5 days/week (would like to  return classes aerobic/weight training); walk 2 miles per   Episodes of Leakage: 5-6 times per day  Amount of leakage: can wet the clothes  Pad use: no  Pad Change frequency: n/a   Nocturia: no  Fluid Intake: 8 cups (3 diet cokes; 1 large coffee, not as much water)  Bladder irritants: 3 diet cokes; 1 large coffee  Urine Stop test: yes  Post void dribble: no  Hovering: no  Empty bladder just in case: yes  Do you ever leak urine without knowing it? no    BOWEL HABITS  Types of symptoms: Pain sometimes with bowel movement  Frequency of bowel movements: 2x/day  Stool consistency: [place] Stool Scale: 3  Do you strain with defecation: No   Laxative use: No    SEXUAL HEALTH STATUS  Marinoff Scale: 0  History of Sexual Abuse: not reported  Sexual [place] Status: active  Pain with initial and/or deep penetration: no  Pain with pelvic exam/tampon use: no    ASSESSMENT  Merle presents to physical therapy evaluation with primary c/o urge incontinence, incomplete emptying of bladder, and leakage. The results of the objective tests and measures show increased pelvic floor muscle tone and weakness and compensatory contraction of accessory muscles (gluteals, adductors and abdominals) with attempt to contract pelvic floor musculature.  Functional deficits include but are not limited to difficulty with managing bladder and bladder function.  Signs and symptoms are consistent with diagnosis of pelvic floor weakness and urge incontinence. Pt and PT discussed evaluation findings, pathology, POC and HEP.  Pt voiced understanding and performs HEP correctly without reported pain. Skilled Pelvic Physical Therapy is medically necessary to address the above impairments and reach functional goals.    OBJECTIVE:   Posture: increased lumbar lordosis  Pelvic Alignment: NT    External Palpation: TTP at B hip adductors      Range Of Motion  Lumbar AROM screen: NT  LE AROM screen: grossly WNL except: NT    Strength (MMT) 5/5 BUFFY LE except  NT  Transverse Abdominis: NT/5    Flexibility Summary: WNL BUFFY LE except NT      Informed consent for internal pelvic evaluation given: Yes    External Observation:   Voluntary contraction: present   Voluntary relaxation: present  Involuntary contraction: present  Involuntary relaxation: present    Mons pubis: WNL  Labia majora: WNL  Labia minora: WNL  Urethral meatus: WNL  Introitus: WNL  Perineal body: WNL    Sensory/Reflex:  Vestibule: Not Tested  Anal [place]: Not Tested    Internal Examination     Pelvic Floor Muscle strength: (PERF= Power/Endurance/Reps/Fast) MMT: 3/3/3/NT  External Anal Sphincter: NT  Accessory Muscle Use: gluteals, adductors, abdominals      Internal Palpation: WNL except Bulbocavernosus R moderate restriction and pain  Ischiocavernosus R moderate restriction and pain  Deep Transverse Perineal B moderate restriction and pain  Urethrovaginalis   minimal restriction and moderate restriction  Pubococcygeus/Pubovaginalis B moderate restriction and pain  Iliococcygeus R moderate restriction and trigger point  Coccygeus R severe restriction  Obturator Internus L moderate restriction and pain  Pelvic Clock   moderate restriction      Today's Treatment and Response:   Patient education was provided on objective findings of external and internal evaluation and expectations with treatment outcomes. Educated on pelvic anatomy and function with diagrams and pelvic model, bladder normatives, adequate hydration levels, proper toileting posture, stress/urge urinary incontinence strategies, and diaphragmatic breathing for PNS activation and pelvic floor relaxation     Patient was instructed in and issued a HEP for: diet/bladder/bowel diary     Charges: PT Eval Moderate Complexity, 1 TA x 15 min, 1NMR x 10 min      Total Timed Treatment: 25 min     Total Treatment Time: 45 min     Based on clinical rationale and outcome measures, this evaluation involved Moderate Complexity decision making due to 3+ personal  factors/comorbidities, 3 body structures involved/activity limitations, and evolving symptoms including urge urinary incontinence  PLAN OF CARE:    Goals: (to be met in 12 visits)  Patient instructed on bladder irritants, increased water intake to 64 ounces /day  Patient to utilize proper toileting posture to allow for daily BM for improved bowel function.   Patient to report urinary frequency to every 2-4 hours to allow for independent ADLs  Patient instructed on Levator Ani contraction inverse to diaphragmatic breathing to allow for pelvic brace with ADLs without valsalva.   Patient exhibits an increase in pelvic floor strength from 3/5 with 3 count hold to 4/5  with 10 count hold to allow for pelvic brace with ADLs.  Patient reports change in [place] stool #3 to #4 with daily bowel moment without straining and prevention of further pelvic organ prolapse.    Patient understands importance of performing HEP to prevent reoccurrence of symptoms.     Frequency / Duration: Patient will be seen for 1 x/week or a total of 12 visits over a 90 day period.  Treatment will include: Gait training, Manual Therapy, Neuromuscular Re-education, Self-Care Home Management, Therapeutic Activities, Therapeutic Exercise, and Home Exercise Program instruction     Education or treatment limitation: None  Rehab Potential:good      Patient/Family/Caregiver was advised of these findings, precautions, and treatment options and has agreed to actively participate in planning and for this course of care.    Thank you for your referral. Please co-sign or sign and return this letter via fax as soon as possible to [phone]. If you have any questions, please contact me at Dept: [phone]    Sincerely,  Electronically signed by therapist: Daysi Abreu PT  Physician's certification required: Yes  I certify the need for these services furnished under this plan of treatment and while under my  care.    X___________________________________________________ Date____________________    Certification From: 9/13/2024  To:12/12/2024

## 2024-09-18 ENCOUNTER — APPOINTMENT (OUTPATIENT)
Dept: PHYSICAL THERAPY | Age: 39
End: 2024-09-18
Attending: ADVANCED PRACTICE MIDWIFE
Payer: MEDICAID

## 2024-09-18 ENCOUNTER — TELEPHONE (OUTPATIENT)
Dept: PHYSICAL THERAPY | Age: 39
End: 2024-09-18

## 2024-09-20 ENCOUNTER — APPOINTMENT (OUTPATIENT)
Dept: PHYSICAL THERAPY | Age: 39
End: 2024-09-20
Attending: ADVANCED PRACTICE MIDWIFE
Payer: MEDICAID

## 2024-09-30 ENCOUNTER — APPOINTMENT (OUTPATIENT)
Dept: PHYSICAL THERAPY | Age: 39
End: 2024-09-30
Attending: ADVANCED PRACTICE MIDWIFE
Payer: MEDICAID

## 2024-09-30 ENCOUNTER — TELEPHONE (OUTPATIENT)
Dept: PHYSICAL THERAPY | Age: 39
End: 2024-09-30

## 2024-09-30 NOTE — TELEPHONE ENCOUNTER
Pt did not show or call. I called pt but no answer and leaving a voice message wasn't an option. I send pt a My Chart message regarding attendance policy and remaining 2 visits have been cancelled. Pt advised to call scheduling line if she would like to reschedule.

## 2024-10-03 ENCOUNTER — APPOINTMENT (OUTPATIENT)
Dept: PHYSICAL THERAPY | Age: 39
End: 2024-10-03
Attending: ADVANCED PRACTICE MIDWIFE
Payer: MEDICAID

## 2024-10-07 ENCOUNTER — APPOINTMENT (OUTPATIENT)
Dept: PHYSICAL THERAPY | Age: 39
End: 2024-10-07
Attending: ADVANCED PRACTICE MIDWIFE
Payer: MEDICAID

## 2025-02-20 ENCOUNTER — APPOINTMENT (OUTPATIENT)
Dept: URBAN - METROPOLITAN AREA CLINIC 313 | Age: 40
Setting detail: DERMATOLOGY
End: 2025-02-20

## 2025-02-20 DIAGNOSIS — D18.0 HEMANGIOMA: ICD-10-CM

## 2025-02-20 DIAGNOSIS — D22 MELANOCYTIC NEVI: ICD-10-CM

## 2025-02-20 DIAGNOSIS — L81.4 OTHER MELANIN HYPERPIGMENTATION: ICD-10-CM

## 2025-02-20 DIAGNOSIS — L57.8 OTHER SKIN CHANGES DUE TO CHRONIC EXPOSURE TO NONIONIZING RADIATION: ICD-10-CM

## 2025-02-20 PROBLEM — D18.01 HEMANGIOMA OF SKIN AND SUBCUTANEOUS TISSUE: Status: ACTIVE | Noted: 2025-02-20

## 2025-02-20 PROBLEM — D22.9 MELANOCYTIC NEVI, UNSPECIFIED: Status: ACTIVE | Noted: 2025-02-20

## 2025-02-20 PROCEDURE — 99213 OFFICE O/P EST LOW 20 MIN: CPT

## 2025-02-20 PROCEDURE — OTHER MIPS QUALITY: OTHER

## 2025-02-20 PROCEDURE — OTHER COUNSELING: OTHER

## 2025-02-20 NOTE — PROCEDURE: COUNSELING
Detail Level: Generalized
Sunscreen Recommendations: ISDIN eryfotona actinica
Topical Retinoids Recommendations: OTC Differin gel
Detail Level: Zone

## 2025-02-20 NOTE — PROCEDURE: MIPS QUALITY
Quality 128: Preventive Care And Screening: Body Mass Index (Bmi) Screening And Follow-Up Plan: BMI not documented, reason not otherwise specified.
Quality 487: Screening For Social Drivers Of Health: Patient not screened for food insecurity, housing instability, transportation needs, utility difficulties, and interpersonal safety
Quality 47: Advance Care Plan: Advance care planning not documented, reason not otherwise specified.
Quality 155 Part A: Falls: Risk Assessment: Falls risk assessment not completed, reason not otherwise specified
Quality 387: Annual Hcv Screening For Patients Who Are Active Injection Drug Users: Screening for HCV infection not received within the 12 month reporting period, reason not given
Detail Level: Generalized
Quality 130: Documentation Of Current Medications In The Medical Record: Current Medications Documented
Quality 155 Part B: Falls: Risk Screening: No documentation of falls status
Quality 226: Preventive Care And Screening: Tobacco Use: Screening And Cessation Intervention: Patient screened for tobacco use and is an ex/non-smoker
Quality 134: Screening For Clinical Depression And Follow-Up Plan: The patient was screened for depression and the screen was negative and no follow up required
Quality 205 Hiv/Aids: Sexually Transmitted Disease Screening For Chlamydia, Gonorrhea, And Syphilis: Chlamydia, gonorrhea, and syphilis screening results not documented as performed, reason not otherwise specified
Quality 400a: One-Time Screening For Hepatitis C Virus (Hcv) And Treatment Initiation: Patient does not receive HCV antibody test OR patient does receive HCV antibody test but results not documented, reason not given
Quality 143: Oncology: Medical And Radiation- Pain Intensity Quantified: Pain severity not assessed.
Quality 431: Preventive Care And Screening: Unhealthy Alcohol Use - Screening: Patient not identified as an unhealthy alcohol user when screened for unhealthy alcohol use using a systematic screening method

## 2025-02-20 NOTE — HPI: FULL BODY SKIN EXAMINATION
How Severe Are Your Spot(S)?: moderate
What Type Of Note Output Would You Prefer (Optional)?: Standard Output
What Is The Reason For Today's Visit?: Full Body Skin Examination
What Is The Reason For Today's Visit? (Being Monitored For X): the development of new lesions
Additional History: Patient has HX of BCC on the nose and was treated 2 years ago.

## (undated) NOTE — LETTER
VACCINE ADMINISTRATION RECORD  PARENT / GUARDIAN APPROVAL  Date: 2024  Vaccine administered to: Merle Bell     : 3/13/1985    MRN: [id number]    A copy of the appropriate Centers for Disease Control and Prevention Vaccine Information statement has been provided. I have read or have had explained the information about the diseases and the vaccines listed below. There was an opportunity to ask questions and any questions were answered satisfactorily. I believe that I understand the benefits and risks of the vaccine cited and ask that the vaccine(s) listed below be given to me or to the person named above (for whom I am authorized to make this request).    VACCINES ADMINISTERED:  Tdap    I have read and hereby agree to be bound by the terms of this agreement as stated above. My signature is valid until revoked by me in writing.  This document is signed by Merle Bell relationship: Self on 2024.:                                                                                                                                         Parent / Guardian Signature                                                Date    Nela CALLIE served as a witness to authentication that the identity of the person signing electronically is in fact the person represented as signing.

## (undated) NOTE — LETTER
Dear New MomBon, we missed you! The nurses of [facility] have tried to reach you by phone to ask if you have any questions regarding your health or the health and care of your new little one.    We hope you are doing well. If, for any reason, you have questions or concerns about your health or your baby’s health, please contact your provider or your pediatrician or family medicine physician regarding your baby.     At [facility], we feel that postpartum support is very important for new families. Please see the enclosed new parent support flyer that lists support programs and resources with both in-person and online options.     Additionally, our Breastfeeding Centers at [facility] and [facility] in [place], offer outpatient visits with our International Board-Certified Lactation   Consultants (IBCLCs) for any breastfeeding concerns or questions you may have.    For issues related to stress, anxiety or depression, we have a Nurturing Mom support group that meets both in-person or online.  There’s also a 24-hour Mom’s Line where you can request a phone call from a clinical therapist for assistance for postpartum depression.    We encourage you to take advantage of these programs and resources as you recover from childbirth and learn to care for your new infant.    Best wishes,    [facility]            [id number]

## (undated) NOTE — LETTER
[facility]  Administration of Anesthesia  IMerle agree to be cared for by a physician anesthesiologist alone and/or with a nurse anesthetist, who is specially trained to monitor me and give me medicine to put me to sleep or keep me comfortable during my procedure    I understand that my anesthesiologist and/or anesthetist is not an employee or agent of [facility] or [employer]. He or she works for [facility]    As the patient asking for anesthesia services, I agree to:  Allow the anesthesiologist (anesthesia doctor) to give me medicine and do additional procedures as necessary. Some examples are: Starting or using an “IV” to give me medicine, fluids or blood during my procedure, and having a breathing tube placed to help me breathe when I’m asleep (intubation). In the event that my heart stops working properly, I understand that my anesthesiologist will make every effort to sustain my life, unless otherwise directed by [facility] Do Not Resuscitate documents.  Tell my anesthesia doctor before my procedure:  If I am pregnant.  The last time that I ate or drank.  iii. All of the medicines I take (including prescriptions, herbal supplements, and pills I can buy without a prescription (including street drugs/illegal medications). Failure to inform my anesthesiologist about these medicines may increase my risk of anesthetic complications.  iv.If I am allergic to anything or have had a reaction to anesthesia before.  I understand how the anesthesia medicine will help me (benefits).  I understand that with any type of anesthesia medicine there are risks:  The most common risks are: nausea, vomiting, sore throat, muscle soreness, damage to my eyes, mouth, or teeth (from breathing tube placement).  Rare risks include: remembering what happened during my procedure, allergic reactions to medications, injury to my airway, heart, lungs, vision, nerves, or  muscles and in extremely rare instances death.  My doctor has explained to me other choices available to me for my care (alternatives).  Pregnant Patients (“epidural”):  I understand that the risks of having an epidural (medicine given into my back to help control pain during labor), include itching, low blood pressure, difficulty urinating, headache or slowing of the baby’s heart. Very rare risks include infection, bleeding, seizure, irregular heart rhythms and nerve injury.  Regional Anesthesia (“spinal”, “epidural”, & “nerve blocks”):  I understand that rare but potential complications include headache, bleeding, infection, seizure, irregular heart rhythms, and nerve injury.    _____________________________________________________________________________  Patient (or Representative) Signature/Relationship to Patient  Date   Time    _____________________________________________________________________________   Name (if used)    Language/Organization   Time    _____________________________________________________________________________  Nurse Anesthetist Signature     Date   Time  _____________________________________________________________________________  Anesthesiologist Signature     Date   Time  I have discussed the procedure and information above with the patient (or patient’s representative) and answered their questions. The patient or their representative has agreed to have anesthesia services.    _____________________________________________________________________________  Witness        Date   Time  I have verified that the signature is that of the patient or patient’s representative, and that it was signed before the procedure  Patient Name: Merle Bell     : 3/13/1985                 Printed: 2024 at 8:59 AM    Medical Record #: [id number]                                            Page 1 of 1  ----------ANESTHESIA CONSENT----------